# Patient Record
Sex: FEMALE | Race: WHITE | NOT HISPANIC OR LATINO | URBAN - METROPOLITAN AREA
[De-identification: names, ages, dates, MRNs, and addresses within clinical notes are randomized per-mention and may not be internally consistent; named-entity substitution may affect disease eponyms.]

---

## 2017-06-01 ENCOUNTER — OUTPATIENT (OUTPATIENT)
Dept: OUTPATIENT SERVICES | Facility: HOSPITAL | Age: 27
LOS: 1 days | End: 2017-06-01

## 2017-06-09 DIAGNOSIS — R69 ILLNESS, UNSPECIFIED: ICD-10-CM

## 2019-04-11 ENCOUNTER — HOSPITAL ENCOUNTER (INPATIENT)
Dept: HOSPITAL 74 - YASAS | Age: 29
LOS: 4 days | Discharge: HOME | DRG: 773 | End: 2019-04-15
Attending: SURGERY | Admitting: SURGERY
Payer: COMMERCIAL

## 2019-04-11 VITALS — BODY MASS INDEX: 27.6 KG/M2

## 2019-04-11 DIAGNOSIS — B18.2: ICD-10-CM

## 2019-04-11 DIAGNOSIS — F32.9: ICD-10-CM

## 2019-04-11 DIAGNOSIS — F12.20: ICD-10-CM

## 2019-04-11 DIAGNOSIS — Z91.5: ICD-10-CM

## 2019-04-11 DIAGNOSIS — F14.20: ICD-10-CM

## 2019-04-11 DIAGNOSIS — F11.20: ICD-10-CM

## 2019-04-11 DIAGNOSIS — F17.210: ICD-10-CM

## 2019-04-11 DIAGNOSIS — J45.20: ICD-10-CM

## 2019-04-11 DIAGNOSIS — F41.8: ICD-10-CM

## 2019-04-11 DIAGNOSIS — Z86.69: ICD-10-CM

## 2019-04-11 DIAGNOSIS — F10.230: Primary | ICD-10-CM

## 2019-04-11 LAB
ALBUMIN SERPL-MCNC: 3.5 G/DL (ref 3.4–5)
ALP SERPL-CCNC: 88 U/L (ref 45–117)
ALT SERPL-CCNC: 17 U/L (ref 13–61)
ANION GAP SERPL CALC-SCNC: 6 MMOL/L (ref 8–16)
AST SERPL-CCNC: 14 U/L (ref 15–37)
BILIRUB SERPL-MCNC: 0.4 MG/DL (ref 0.2–1)
BUN SERPL-MCNC: 13 MG/DL (ref 7–18)
CALCIUM SERPL-MCNC: 8.3 MG/DL (ref 8.5–10.1)
CHLORIDE SERPL-SCNC: 106 MMOL/L (ref 98–107)
CO2 SERPL-SCNC: 28 MMOL/L (ref 21–32)
CREAT SERPL-MCNC: 0.7 MG/DL (ref 0.55–1.3)
DEPRECATED RDW RBC AUTO: 16.5 % (ref 11.6–15.6)
GLUCOSE SERPL-MCNC: 80 MG/DL (ref 74–106)
HCT VFR BLD CALC: 39.6 % (ref 32.4–45.2)
HGB BLD-MCNC: 13 GM/DL (ref 10.7–15.3)
MCH RBC QN AUTO: 27.7 PG (ref 25.7–33.7)
MCHC RBC AUTO-ENTMCNC: 32.9 G/DL (ref 32–36)
MCV RBC: 84.1 FL (ref 80–96)
PLATELET # BLD AUTO: 190 K/MM3 (ref 134–434)
PMV BLD: 9.8 FL (ref 7.5–11.1)
POTASSIUM SERPLBLD-SCNC: 4.1 MMOL/L (ref 3.5–5.1)
PROT SERPL-MCNC: 6.9 G/DL (ref 6.4–8.2)
RBC # BLD AUTO: 4.71 M/MM3 (ref 3.6–5.2)
SODIUM SERPL-SCNC: 139 MMOL/L (ref 136–145)
WBC # BLD AUTO: 7.7 K/MM3 (ref 4–10)

## 2019-04-11 PROCEDURE — HZ2ZZZZ DETOXIFICATION SERVICES FOR SUBSTANCE ABUSE TREATMENT: ICD-10-PCS | Performed by: SURGERY

## 2019-04-11 RX ADMIN — Medication SCH MG: at 22:13

## 2019-04-11 NOTE — HP
CIWA Score


Nausea/Vomiting: 3 (vomit x 2)


Muscle Tremors: 1-None Visible, but Felt


Anxiety: 3


Agitation: 0-Normal Activity


Paroxysmal Sweats: 1-Minimal Palms Moist


Orientation: 1-Uncertain about Date


Tacttile Disturbances: 2-Mild Itch/Numbness/Burn (toes bilateral)


Auditory Disturbances: 2-Mild Harshness/Frighten


Visual Disturbances: 0-None


Headache: 0-None Present


CIWA-Ar Total Score: 13





- Admission Criteria


OASAS Guidelines: Admission for Medically Managed Detox: 


Requires at least one of the followin. CIWA greater than 12


2. Seizures within the past 24 hours


3. Delirium tremens within the past 24 hours


4. Hallucinations within the past 24 hours


5. Acute intervention needed for co  occurring medical disorder


6. Acute intervention needed for co  occurring psychiatric disorder


7. Severe withdrawal that cannot be handled at a lower level of care (continued


    vomiting, continued diarrhea, abnormal vital signs) requiring intravenous


    medication and/or fluids


8. Pregnancy





Patient presents the following: CIWA greater than 12


Admission Criteria Met: Admission criteria met





Admission ROS [facility]


Chief Complaint: 





" detox"


Allergies/Adverse Reactions: 


 Allergies











Allergy/AdvReac Type Severity Reaction Status Date / Time


 


tramadol Allergy   Verified 19 13:00











History of Present Illness: 





30 yo female, homeless, with hx of alcohol , keon, K2 and heroin (IV) dependence 

is here seeking detox. [facility] on methadone 120 mg qd, last dose 

today, dose pending verification. Utox positive fir THC, KEON, MET, FEN, MOP, 

Oxy. JOSE F = 0.00. PMHX: Asthma, Hep C. depression, anxiety and PTSD (non-

adherent. Reports hx of seizure cause by tramadol "couple of years ago." Denies 

hx of suicidal ideation x "couple of times" with last episode on year ago. 

Denies suicidal / homicidal ideation. Longest period of sobriety three months 

in 2017.  Last detox [facility] one year ago.


Exam Limitations: No Limitations





- Ebola screening


Have you traveled outside of the country in the last 21 days: No (N)


Have you had contact with anyone from an Ebola affected area: No


Do you have a fever: No





- Review of Systems


Constitutional: Loss of Appetite, Changes in sleep, Unintentional Wgt. Loss (

20lbs in past two months), Other (fatigue)


EENT: reports: Nose Congestion, Dental Problems (+ multiple caries)


Respiratory: reports: No Symptoms reported


Cardiac: reports: No Symptoms Reported


GI: reports: Nausea, Poor Appetite, Poor Fluid Intake, Vomiting


: reports: No Symptoms Reported


Musculoskeletal: reports: Back Pain


Integumentary: reports: No Symptoms Reported


Neuro: reports: Numbness (b/l toes)


Endocrine: reports: Increased Thirst


Hematology: reports: No Symptoms Reported


Psychiatric: reports: Orientated x3, Anxious


Other Systems: Reviewed and Negative





Patient History





- Patient Medical History


Hx Anemia: No


Hx Asthma: Yes


Hx Chronic Obstructive Pulmonary Disease (COPD): No


Hx Cancer: No


Hx Cardiac Disorders: Yes (Bradycardia)


Hx Congestive Heart Failure: No


Hx Hypertension: No


Hx Hypercholesterolemia: No


Hx Pacemaker: No


HX Cerebrovascular Accident: No


Hx Seizures: Yes (seizure cause by tramadol "couple of years ago." )


Hx Dementia: No


Hx Diabetes: No


Hx Gastrointestinal Disorders: No


Hx Liver Disease: Yes


Hx Genitourinary Disorders: No


Hx Sexually Transmitted Disorders: No


Hx Renal Disease (ESRD): No


Hx Thyroid Disease: No


Hx Human Immunodeficiency Virus (HIV): No


Hx Hepatitis C: Yes


Hx Depression: Yes


Hx Suicide Attempt: Yes (couple of times" with last episode on year ago)


Hx Bipolar Disorder: No


Hx Schizophrenia: No





- Patient Surgical History


Past Surgical History: Yes


Hx  Section: Yes (x4)


Other Surgical History: ganglion cyst removal, gallblader remove 





- PPD History


Previous Implant?: No


Documented Results: Negative w/o proof


PPD to be Administered?: Yes





- Reproductive History


Patient is a Female of Child Bearing Age (11 -55 yrs old): Yes


Last Menstrual Period: 19 (appx )


Patient Pregnant: No





- Smoking Cessation


Smoking history: Current every day smoker


Have you smoked in the past 12 months: Yes


Aproximately how many cigarettes per day: 13


Hx Chewing Tobacco Use: No


Initiated information on smoking cessation: Yes


'Breaking Loose' booklet given: 19





- Substance & Tx. History


Hx Alcohol Use: Yes


Hx Substance Use: Yes


Substance Use Type: Alcohol, Cocaine, Heroin, Marijuana


Hx Substance Use Treatment: Yes ( Last detox [facility] one year ago.)





- Substances abused


  ** Heroin


Substance route: Injection


Frequency: Daily


Amount used: 4 BAGS


Age of first use: 20


Date of last use: 19





  ** Cocaine


Substance route: Injection


Frequency: Daily


Amount used: 1 BAG


Age of first use: 20


Date of last use: 19





  ** K2/Spice


Substance route: Smoking


Frequency: Daily


Amount used: 15 STICKS


Age of first use: 28


Date of last use: 19





  ** Alcohol


Substance route: Oral


Frequency: Daily


Amount used: 2 nips + 2 x 24 oz gloria 


Age of first use: 12


Date of last use: 19





Family Disease History





- Family Disease History


Family Disease History: Other: Father (alcohol )





Admission Physical Exam [facility]





- Vital Signs


Vital Signs: 


 Vital Signs - 24 hr











  19





  13:03


 


Temperature 96.8 F L


 


Pulse Rate 56 L


 


Respiratory 18





Rate 


 


Blood Pressure 94/56 L














- Physical


General Appearance: Yes: Disheveled, Mild Distress, Thin, Sweating, Anxious


HEENTM: Yes: EOMI, Hearing grossly Normal, Normal ENT Inspection, Normocephalic

, Normal Voice, EZEQUIEL, Pharynx Normal, Tm's normal, Rhinorrhea, Other (poor 

dentition)


Respiratory: Yes: Chest Non-Tender, Lungs Clear, No Respiratory Distress, No 

Accessory Muscle Use, Wheezing


Neck: Yes: Within Normal Limits


Breast: Yes: Breast Exam Deferred


Cardiology: Yes: Regular Rhythm, Bradycardia


Abdominal: Yes: Normal Bowel Sounds, Non Tender, Soft, Protuberent


Genitourinary: Yes: Within Normal Limits


Back: Yes: Normal Inspection


Musculoskeletal: Yes: Back pain, Other (right hip pain)


Extremities: Yes: Normal Capillary Refill, Normal Inspection, Normal Range of 

Motion


Neurological: Yes: CNs II-XII NML intact, Fully Oriented, Alert, Motor Strength 

5/5, Depressed Affect


Integumentary: Yes: Normal Color, Dry, Warm, Track Marks (bilateral anticubital 

fossa no infection present)


Lymphatic: Yes: Within Normal Limits





- Diagnostic


(1) Opioid dependence on agonist therapy


Current Visit: Yes   Status: Chronic   Comment: on methadone maintance 120 mg qd

, last dose today, dose pending verification    





(2) Cocaine dependence


Current Visit: Yes   Status: Acute   





(3) Cannabis dependence


Current Visit: Yes   Status: Acute   





(4) Alcohol dependence with uncomplicated withdrawal


Current Visit: Yes   Status: Acute   





(5) Back pain


Current Visit: Yes   Status: Acute   


Qualifiers: 


   Back pain location: low back pain   Back pain laterality: right   Sciatica 

presence: without sciatica 





(6) Asthma


Current Visit: Yes   Status: Chronic   


Qualifiers: 


   Asthma severity: mild   Asthma persistence: intermittent   Asthma 

complication type: uncomplicated   Qualified Code(s): J45.20 - Mild 

intermittent asthma, uncomplicated   





(7) Wheezing


Current Visit: Yes   Status: Acute   





Cleared for Admission S





- Detox or Rehab


[facility] Level of Care: Medically Managed


Detox Regimen/Protocol: Librium





Breathalyzer





- Breathalyzer


Breathalyzer: 0





POC Urine pregnancy test





- Test device


Pregnancy test lot number: [id number]


Expiration date: 20





- Control


Pregnancy test control: Yes





- Result


Urine Pregnancy Test Results: Negative - NO line present





Urine Drug Screen





- Test Device


Lot number: [id number]


Expiration date: 20





- Control


Is test valid?: Yes





- Results


Drug screen NEGATIVE: No


Urine drug screen results: THC-Marijuana, KEON-Cocaine, MET-Methamphetamine, FEN-

Fentanyl, MOP-Opiates, OXY-Oxycodone





Inpatient Rehab Admission





- Rehab Decision to Admit


Inpatient rehab admission?: No

## 2019-04-12 RX ADMIN — Medication SCH: at 22:44

## 2019-04-12 RX ADMIN — ONDANSETRON PRN MG: 4 TABLET, ORALLY DISINTEGRATING ORAL at 15:55

## 2019-04-12 RX ADMIN — Medication SCH TAB: at 09:58

## 2019-04-12 RX ADMIN — TRIMETHOBENZAMIDE HYDROCHLORIDE PRN MG: 100 INJECTION INTRAMUSCULAR at 10:50

## 2019-04-12 RX ADMIN — NICOTINE SCH MG: 14 PATCH, EXTENDED RELEASE TRANSDERMAL at 10:02

## 2019-04-12 RX ADMIN — IBUPROFEN PRN MG: 400 TABLET, FILM COATED ORAL at 01:46

## 2019-04-12 RX ADMIN — TRIMETHOBENZAMIDE HYDROCHLORIDE PRN MG: 100 INJECTION INTRAMUSCULAR at 18:15

## 2019-04-12 NOTE — PN
[facility] Progress Note


Note: 





ekg performed; sinus pillo prolonged qt. pulse 54.


pt denies of any chest pain/discomfort. Pt appears a bit better since receiving 

tigan IM. zofran SL prn also ordered


pt states she is feeling a bit better since taking a shower.


will continue to monitor

## 2019-04-12 NOTE — PN
S CIWA





- CIWA Score


Nausea/Vomitin


Muscle Tremors: 3


Anxiety: 3


Agitation: 4-Moderately Restless


Paroxysmal Sweats: 3


Orientation: 0-Oriented


Tacttile Disturbances: 0-None


Auditory Disturbances: 0-None


Visual Disturbances: 0-None


Headache: 0-None Present


CIWA-Ar Total Score: 15





[facility] Progress Note (SOAP)


Subjective: 





sweats


shakes


interrupted sleep


nausea


vomiting


Objective: 





19 13:53


 Vital Signs











Temperature  97.5 F L  19 09:21


 


Pulse Rate  53 L  19 09:21


 


Respiratory Rate  16   19 09:21


 


Blood Pressure  115/60   19 09:21


 


O2 Sat by Pulse Oximetry (%)      








 Laboratory Tests











  19





  14:30 14:30 14:30


 


WBC   7.7 


 


RBC   4.71 


 


Hgb   13.0 


 


Hct   39.6 


 


MCV   84.1 


 


MCH   27.7 


 


MCHC   32.9 


 


RDW   16.5 H 


 


Plt Count   190 


 


MPV   9.8 


 


Sodium  139  


 


Potassium  4.1  


 


Chloride  106  


 


Carbon Dioxide  28  


 


Anion Gap  6 L  


 


BUN  13  


 


Creatinine  0.7  


 


Creat Clearance w eGFR  98.25  


 


Random Glucose  80  


 


Calcium  8.3 L  


 


Total Bilirubin  0.4  


 


AST  14 L  


 


ALT  17  


 


Alkaline Phosphatase  88  


 


Total Protein  6.9  


 


Albumin  3.5  


 


RPR Titer    Nonreactive








aaox3


lying in bed


no acute distress


Assessment: 





19 13:53


withdrawls sx


Plan: 





continue detox


increase fluids


tigan IM prn

## 2019-04-12 NOTE — PN
[facility] Progress Note


Note: 





Psychiatric nurse practitioner note:


Writer attempted to see patient twice for psychiatric consultation. Patient is 

lethargic and vomiting. She is actively withdrawing. She reports not feeling 

well today and stated to writer, " I do not want to see psychiatry today. I do 

not feel well. Its ok. thank you. Nursing staff informed.

## 2019-04-13 RX ADMIN — METHOCARBAMOL PRN MG: 500 TABLET ORAL at 16:47

## 2019-04-13 RX ADMIN — IBUPROFEN PRN MG: 400 TABLET, FILM COATED ORAL at 08:10

## 2019-04-13 RX ADMIN — Medication SCH TAB: at 09:05

## 2019-04-13 RX ADMIN — ONDANSETRON PRN MG: 4 TABLET, ORALLY DISINTEGRATING ORAL at 01:25

## 2019-04-13 RX ADMIN — NICOTINE SCH MG: 14 PATCH, EXTENDED RELEASE TRANSDERMAL at 09:25

## 2019-04-13 RX ADMIN — IBUPROFEN PRN MG: 400 TABLET, FILM COATED ORAL at 22:25

## 2019-04-13 RX ADMIN — ONDANSETRON PRN MG: 4 TABLET, ORALLY DISINTEGRATING ORAL at 13:53

## 2019-04-13 RX ADMIN — Medication PRN MG: at 22:25

## 2019-04-13 RX ADMIN — ONDANSETRON PRN MG: 4 TABLET, ORALLY DISINTEGRATING ORAL at 22:25

## 2019-04-13 RX ADMIN — METHADONE HYDROCHLORIDE SCH MG: 40 TABLET ORAL at 05:40

## 2019-04-13 RX ADMIN — Medication SCH MG: at 22:24

## 2019-04-13 NOTE — PN
S CIWA





- CIWA Score


Nausea/Vomitin


Muscle Tremors: 2


Anxiety: 3


Agitation: 2


Paroxysmal Sweats: 2


Orientation: 0-Oriented


Tacttile Disturbances: 2-Mild Itch/Numbness/Burn


Auditory Disturbances: 0-None


Visual Disturbances: 0-None


Headache: 2-Mild


CIWA-Ar Total Score: 15





[facility] Progress Note (SOAP)


Subjective: 





Tremors, sweats, abdominal cramps, back pain


Objective: 





19 12:44


 Vital Signs











  19





  06:32 10:00


 


Temperature 98.2 F 97.9 F


 


Pulse Rate 58 L 59 L


 


Respiratory 18 16





Rate  


 


Blood Pressure 119/67 101/58 L








 Laboratory Last Values











WBC  7.7 K/mm3 (4.0-10.0)   19  14:30    


 


RBC  4.71 M/mm3 (3.60-5.2)   19  14:30    


 


Hgb  13.0 GM/dL (10.7-15.3)   19  14:30    


 


Hct  39.6 % (32.4-45.2)   19  14:30    


 


MCV  84.1 fl (80-96)   19  14:30    


 


MCH  27.7 pg (25.7-33.7)   19  14:30    


 


MCHC  32.9 g/dl (32.0-36.0)   19  14:30    


 


RDW  16.5 % (11.6-15.6)  H  19  14:30    


 


Plt Count  190 K/MM3 (134-434)   19  14:30    


 


MPV  9.8 fl (7.5-11.1)   19  14:30    


 


Sodium  139 mmol/L (136-145)   19  14:30    


 


Potassium  4.1 mmol/L (3.5-5.1)   19  14:30    


 


Chloride  106 mmol/L ()   19  14:30    


 


Carbon Dioxide  28 mmol/L (21-32)   19  14:30    


 


Anion Gap  6 MMOL/L (8-16)  L  19  14:30    


 


BUN  13 mg/dL (7-18)   19  14:30    


 


Creatinine  0.7 mg/dL (0.55-1.3)   19  14:30    


 


Creat Clearance w eGFR  98.25  (>60)   19  14:30    


 


Random Glucose  80 mg/dL ()   19  14:30    


 


Calcium  8.3 mg/dL (8.5-10.1)  L  19  14:30    


 


Total Bilirubin  0.4 mg/dL (0.2-1)   19  14:30    


 


AST  14 U/L (15-37)  L  19  14:30    


 


ALT  17 U/L (13-61)   19  14:30    


 


Alkaline Phosphatase  88 U/L ()   19  14:30    


 


Total Protein  6.9 g/dl (6.4-8.2)   19  14:30    


 


Albumin  3.5 g/dl (3.4-5.0)   19  14:30    


 


RPR Titer  Nonreactive  (NONREACTIVE)   19  14:30    








Labs noted


Assessment: 





19 12:44


Withdrawal sx


Plan: 





Continue detox

## 2019-04-14 RX ADMIN — HYDROXYZINE PAMOATE PRN MG: 25 CAPSULE ORAL at 16:50

## 2019-04-14 RX ADMIN — ONDANSETRON PRN MG: 4 TABLET, ORALLY DISINTEGRATING ORAL at 06:30

## 2019-04-14 RX ADMIN — METHOCARBAMOL PRN MG: 500 TABLET ORAL at 10:40

## 2019-04-14 RX ADMIN — Medication SCH MG: at 22:21

## 2019-04-14 RX ADMIN — METHOCARBAMOL PRN MG: 500 TABLET ORAL at 16:50

## 2019-04-14 RX ADMIN — IBUPROFEN PRN MG: 400 TABLET, FILM COATED ORAL at 09:39

## 2019-04-14 RX ADMIN — LIDOCAINE SCH PATCH: 50 PATCH TOPICAL at 14:58

## 2019-04-14 RX ADMIN — HYDROXYZINE PAMOATE PRN MG: 25 CAPSULE ORAL at 22:21

## 2019-04-14 RX ADMIN — Medication PRN MG: at 22:22

## 2019-04-14 RX ADMIN — IBUPROFEN PRN MG: 400 TABLET, FILM COATED ORAL at 22:21

## 2019-04-14 RX ADMIN — NICOTINE SCH MG: 14 PATCH, EXTENDED RELEASE TRANSDERMAL at 09:42

## 2019-04-14 RX ADMIN — METHADONE HYDROCHLORIDE SCH MG: 40 TABLET ORAL at 05:21

## 2019-04-14 RX ADMIN — Medication SCH TAB: at 09:44

## 2019-04-14 NOTE — PN
[facility] Progress Note


Note: 


PATIENT CONTINUES WITH DETOX REGIMEN FOR ETOH WITHDRAWAL. PATIENT C/O MILD 

ANXIETY AND BACK PAIN. 


 Vital Signs











Temperature  97.1 F L  04/14/19 09:29


 


Pulse Rate  79   04/14/19 09:29


 


Respiratory Rate  16   04/14/19 09:29


 


Blood Pressure  94/59 L  04/14/19 09:29


 


O2 Sat by Pulse Oximetry (%)      








 Laboratory Tests











  04/11/19 04/11/19 04/11/19





  14:30 14:30 14:30


 


WBC   7.7 


 


RBC   4.71 


 


Hgb   13.0 


 


Hct   39.6 


 


MCV   84.1 


 


MCH   27.7 


 


MCHC   32.9 


 


RDW   16.5 H 


 


Plt Count   190 


 


MPV   9.8 


 


Sodium  139  


 


Potassium  4.1  


 


Chloride  106  


 


Carbon Dioxide  28  


 


Anion Gap  6 L  


 


BUN  13  


 


Creatinine  0.7  


 


Creat Clearance w eGFR  98.25  


 


Random Glucose  80  


 


Calcium  8.3 L  


 


Total Bilirubin  0.4  


 


AST  14 L  


 


ALT  17  


 


Alkaline Phosphatase  88  


 


Total Protein  6.9  


 


Albumin  3.5  


 


RPR Titer    Nonreactive








PE:





ALERT AND ORIENTED X 3


SKIN WARM AND DRY


CAR S1S2


RESP CTA BL


EXT FULL ROM, AMB AD TAYLA, +LS SPINE TENDERNESS


+ANXIETY





A/P:





WITHDRAWAL SX


LBP











CONTINUE DETOX


ENCOURAGE ORAL FLUIDS


ADD VISTARIL 25MG EVERY 6 HR PRN FOR ANXIETY


LIDOCAINE PATCH FOR LBP


FOR D/C IN AM

## 2019-04-14 NOTE — PN
[facility] Progress Note


Note: 





withdrawal symptom


 Vital Signs











Temperature  98.1 F   04/14/19 15:07


 


Pulse Rate  76   04/14/19 14:55


 


Respiratory Rate  17   04/14/19 14:55


 


Blood Pressure  117/69   04/14/19 14:55


 


O2 Sat by Pulse Oximetry (%)      








librium 10 mgs po now


ensure plus 12 mls po bid


close monitoring

## 2019-04-15 VITALS — TEMPERATURE: 97.9 F | SYSTOLIC BLOOD PRESSURE: 101 MMHG | HEART RATE: 96 BPM | DIASTOLIC BLOOD PRESSURE: 80 MMHG

## 2019-04-15 RX ADMIN — LIDOCAINE SCH: 50 PATCH TOPICAL at 10:31

## 2019-04-15 RX ADMIN — METHADONE HYDROCHLORIDE SCH MG: 40 TABLET ORAL at 05:05

## 2019-04-15 RX ADMIN — IBUPROFEN PRN MG: 400 TABLET, FILM COATED ORAL at 05:33

## 2019-04-15 RX ADMIN — Medication SCH: at 10:31

## 2019-04-15 RX ADMIN — NICOTINE SCH: 14 PATCH, EXTENDED RELEASE TRANSDERMAL at 10:31

## 2019-04-15 NOTE — DS
[facility] Detox Discharge Summary


Admission Date: 


04/11/19





Discharge Date: 04/15/19





- History


Present History: Alcohol Dependence, Cannabis Dependence, Cocaine Dependence





- Physical Exam Results


Vital Signs: 


 Vital Signs











Temperature  97.9 F   04/15/19 03:00


 


Pulse Rate  96 H  04/15/19 03:00


 


Respiratory Rate  18   04/15/19 03:00


 


Blood Pressure  101/80   04/15/19 03:00


 


O2 Sat by Pulse Oximetry (%)      














- Treatment


Hospital Course: Detox Protocol Followed, Detoxed Safely, Responded well, 

Discharged Condition Good, Rehab Referral Accepted





- Medication


Discharge Medications: 


Ambulatory Orders





Gabapentin [Neurontin -] 200 mg PO DAILY 04/11/19 











- Diagnosis


(1) Alcohol dependence with uncomplicated withdrawal


Current Visit: Yes   Status: Chronic   





(2) Back pain


Current Visit: Yes   Status: Chronic   


Qualifiers: 


   Back pain location: low back pain   Back pain laterality: right   Sciatica 

presence: without sciatica 





(3) Cannabis dependence


Current Visit: Yes   Status: Chronic   





(4) Cocaine dependence


Current Visit: Yes   Status: Chronic   


Qualifiers: 


   Substance use status: uncomplicated   Qualified Code(s): F14.20 - Cocaine 

dependence, uncomplicated   





(5) Wheezing


Current Visit: Yes   Status: Acute   





(6) Asthma


Current Visit: Yes   Status: Chronic   


Qualifiers: 


   Asthma severity: mild   Asthma persistence: intermittent   Asthma 

complication type: uncomplicated   Qualified Code(s): J45.20 - Mild 

intermittent asthma, uncomplicated   





(7) Opioid dependence on agonist therapy


Current Visit: Yes   Status: Chronic   





- AMA


Did Patient Leave Against Medical Advice: No (referred to [facility] 

methadone clinic)

## 2019-04-15 NOTE — EKG
Test Reason : 

Blood Pressure : ***/*** mmHG

Vent. Rate : 054 BPM     Atrial Rate : 054 BPM

   P-R Int : 116 ms          QRS Dur : 080 ms

    QT Int : 520 ms       P-R-T Axes : -08 -02 021 degrees

   QTc Int : 493 ms

 

SINUS BRADYCARDIA

PROLONGED QT

ABNORMAL ECG

NO PREVIOUS ECGS AVAILABLE

Confirmed by AMELIE ESCOTO, MALKA ([id number]) on 4/15/2019 10:40:29 AM

 

Referred By: CLARENCE BERKOWITZ           Confirmed By:MALKA KINSEY MD

## 2020-06-23 ENCOUNTER — HOSPITAL ENCOUNTER (INPATIENT)
Dept: HOSPITAL 74 - YASAS | Age: 30
LOS: 5 days | Discharge: HOME | DRG: 773 | End: 2020-06-28
Attending: ALLERGY & IMMUNOLOGY | Admitting: ALLERGY & IMMUNOLOGY
Payer: COMMERCIAL

## 2020-06-23 VITALS — BODY MASS INDEX: 28.3 KG/M2

## 2020-06-23 DIAGNOSIS — Z91.5: ICD-10-CM

## 2020-06-23 DIAGNOSIS — R74.0: ICD-10-CM

## 2020-06-23 DIAGNOSIS — R00.1: ICD-10-CM

## 2020-06-23 DIAGNOSIS — Z59.0: ICD-10-CM

## 2020-06-23 DIAGNOSIS — Z62.810: ICD-10-CM

## 2020-06-23 DIAGNOSIS — R82.90: ICD-10-CM

## 2020-06-23 DIAGNOSIS — F14.20: ICD-10-CM

## 2020-06-23 DIAGNOSIS — F17.210: ICD-10-CM

## 2020-06-23 DIAGNOSIS — K59.00: ICD-10-CM

## 2020-06-23 DIAGNOSIS — F43.10: ICD-10-CM

## 2020-06-23 DIAGNOSIS — R79.89: ICD-10-CM

## 2020-06-23 DIAGNOSIS — F11.20: ICD-10-CM

## 2020-06-23 DIAGNOSIS — F19.282: ICD-10-CM

## 2020-06-23 DIAGNOSIS — Z88.8: ICD-10-CM

## 2020-06-23 DIAGNOSIS — R11.0: ICD-10-CM

## 2020-06-23 DIAGNOSIS — Z86.69: ICD-10-CM

## 2020-06-23 DIAGNOSIS — F10.230: Primary | ICD-10-CM

## 2020-06-23 PROCEDURE — HZ2ZZZZ DETOXIFICATION SERVICES FOR SUBSTANCE ABUSE TREATMENT: ICD-10-PCS | Performed by: ALLERGY & IMMUNOLOGY

## 2020-06-23 PROCEDURE — U0003 INFECTIOUS AGENT DETECTION BY NUCLEIC ACID (DNA OR RNA); SEVERE ACUTE RESPIRATORY SYNDROME CORONAVIRUS 2 (SARS-COV-2) (CORONAVIRUS DISEASE [COVID-19]), AMPLIFIED PROBE TECHNIQUE, MAKING USE OF HIGH THROUGHPUT TECHNOLOGIES AS DESCRIBED BY CMS-2020-01-R: HCPCS

## 2020-06-23 RX ADMIN — HYDROXYZINE PAMOATE SCH MG: 25 CAPSULE ORAL at 22:44

## 2020-06-23 RX ADMIN — Medication SCH TAB: at 17:40

## 2020-06-23 RX ADMIN — ONDANSETRON PRN MG: 4 TABLET, ORALLY DISINTEGRATING ORAL at 17:46

## 2020-06-23 RX ADMIN — Medication SCH MG: at 22:43

## 2020-06-23 RX ADMIN — HYDROXYZINE PAMOATE SCH MG: 25 CAPSULE ORAL at 17:40

## 2020-06-23 RX ADMIN — NICOTINE SCH MG: 7 PATCH TRANSDERMAL at 17:42

## 2020-06-23 RX ADMIN — IBUPROFEN PRN MG: 400 TABLET, FILM COATED ORAL at 22:47

## 2020-06-23 SDOH — ECONOMIC STABILITY - HOUSING INSECURITY: HOMELESSNESS: Z59.0

## 2020-06-23 NOTE — BHS.RME
Substance Use & Tx History





- Substance Use History


  ** Alcohol


Substance amount: 4-5 beers 12 0z


Frequency of use: Daily


Substance route: Oral


Date of Last Use: 20 (9am)





  ** Heroin


Substance amount: 1 bundle


Frequency of use: Daily


Substance route: Injection (ex: intravenous or skin popping)


Date of Last Use: 20





  ** Nicotine


Substance amount: 1 pack


Frequency of use: Daily


Substance route: Smoking


Date of Last Use: 20





Physical/Psych/Mental Status





- Behavior


General Behavior: Increased activity (restlessness, agitation)


Eye Contact: Normal





- Cooperativeness


Cooperativeness: Cooperative





- Thinking


Thought Processes: Tight, Logical, Goal Directed





- Physical Health Problems


Is patient presently having any pain?: No


Does patient presently have any injuries (include location): No


Does patient currently have a fever: No


Is patient pregnant: No





CIWA


Nausea/Vomitin-Mild Nausea/No Vomiting


Muscle Tremors: 1-None Visible, but Felt


Anxiety: 4-Mod. Anxious/Guarded


Agitation: 4-Moderately Restless


Paroxysmal Sweats: 5


Orientation: 0-Oriented


Tacttile Disturbances: 0-None


Auditory Disturbances: 0-None


Visual Disturbances: 0-None


Headache: 0-None Present


CIWA-Ar Total Score: 15

## 2020-06-23 NOTE — HP
CIWA Score


Nausea/Vomitin-Mild Nausea/No Vomiting


Muscle Tremors: 1-None Visible, but Felt


Anxiety: 4-Mod. Anxious/Guarded


Agitation: 4-Moderately Restless


Paroxysmal Sweats: 5


Orientation: 0-Oriented


Tacttile Disturbances: 0-None


Auditory Disturbances: 0-None


Visual Disturbances: 0-None


Headache: 0-None Present


CIWA-Ar Total Score: 15





- Admission Criteria


OASAS Guidelines: Admission for Medically Managed Detox: 


Requires at least one of the followin. CIWA greater than 12


2. Seizures within the past 24 hours


3. Delirium tremens within the past 24 hours


4. Hallucinations within the past 24 hours


5. Acute intervention needed for co  occurring medical disorder


6. Acute intervention needed for co  occurring psychiatric disorder


7. Severe withdrawal that cannot be handled at a lower level of care (continued


    vomiting, continued diarrhea, abnormal vital signs) requiring intravenous


    medication and/or fluids


8. Pregnancy








Admitting History and Physical





- Admission


Chief Complaint: Ms. Bonner is a 31 yo woman who presents to [facility] 

requesting admission to detox for alcohol and heroin use disorder.


History of Present Illness: 


Ms. Bonner is a 31 yo woman who presents to [facility] requesting admission to 

detox for alcohol and heroin use disorder. 





She was here in 2019 for detox.  She did not go to rehab afterwards and 

relapsed one day post discharge.





PMH: HCV (+) but told last test was negative, never tx, asthma on an inhaler


PSH: cholecystectomy, ganglion cyst b/l wrist., C section x 5


Psych: anxiety, depression PtSD, no meds


SoC: homeless, on the street, no shelter


Legal: none





Substance Use History


  ** Alcohol


Substance amount: 4-5 beers 12 0z


Frequency of use: Daily


Substance route: Oral


Date of Last Use: 20 (9am)


first : age 20 y





Seizure 2019


Blackout one week ago


Admits to eye opener





  ** Heroin


Substance amount: 1 bundle


Frequency of use: Daily


Substance route: Injection (ex: intravenous or skin popping)


Date of Last Use: 20


Fist use age 16 y


No OD


Has Narcan





  ** Nicotine


Substance amount: 1 pack


Frequency of use: Daily


Substance route: Smoking


Date of Last Use: 20


First use age 12 y





K2 3 blunts daily, began age 29 y, last smoke today





Methadone program, 100 mg, last medicated .  [facility]


History Source: Patient


Limitations to Obtaining History: No Limitations





- Past Medical History


...LMP: 19





- Smoking History


Smoking history: Current every day smoker


Have you smoked in the past 12 months: Yes


Aproximately how many cigarettes per day: 13





- Alcohol/Substance Use


Hx Alcohol Use: Yes





Admission ROS [facility]





- HPI


Allergies/Adverse Reactions: 


                                    Allergies











Allergy/AdvReac Type Severity Reaction Status Date / Time


 


tramadol Allergy   Verified 19 13:00











Exam Limitations: No Limitations





- Ebola screening


Have you been sick,other than usual withdrawal symptoms: No


Do you have a fever: No





- Review of Systems


Constitutional: Unintentional Wgt. Loss


EENT: reports: Hearing Loss (right ear)


Respiratory: reports: No Symptoms reported


Cardiac: reports: No Symptoms Reported


GI: reports: Nausea


: reports: No Symptoms Reported


Musculoskeletal: reports: Back Pain


Integumentary: reports: No Symptoms Reported


Neuro: reports: No Symptoms reported


Endocrine: reports: No Symptoms Reported


Hematology: reports: No Symptoms Reported


Psychiatric: reports: Anxious





Patient History





- Patient Medical History


Hx Anemia: No


Hx Asthma: Yes


Hx Chronic Obstructive Pulmonary Disease (COPD): No


Hx Cancer: No


Hx Cardiac Disorders: Yes (Bradycardia)


Hx Congestive Heart Failure: No


Hx Hypertension: No


Hx Hypercholesterolemia: No


Hx Pacemaker: No


HX Cerebrovascular Accident: No


Hx Seizures: Yes (seizure cause by tramadol "couple of years ago." )


Hx Dementia: No


Hx Diabetes: No


Hx Gastrointestinal Disorders: No


Hx Liver Disease: Yes


Hx Genitourinary Disorders: No


Hx Sexually Transmitted Disorders: No


Hx Renal Disease (ESRD): No


Hx Thyroid Disease: No


Hx Human Immunodeficiency Virus (HIV): No


Hx Hepatitis C: Yes


Hx Depression: Yes


Hx Suicide Attempt: Yes (couple of times" with last episode on year ago)


Hx Bipolar Disorder: No


Hx Schizophrenia: No





- Patient Surgical History


Past Surgical History: Yes


Hx Neurologic Surgery: No


Hx Cataract Extraction: No


Hx Cardiac Surgery: No


Hx Lung Surgery: No


Hx Breast Surgery: No


Hx Breast Biopsy: No


Hx Abdominal Surgery: No


Hx Appendectomy: No


Hx Cholecystectomy: No


Hx Genitourinary Surgery: No


Hx  Section: Yes (x4)


Hx Orthopedic Surgery: No


Other Surgical History: ganglion cyst removal, gallblader remove 





- PPD History


Date: 19





- Reproductive History


Last Menstrual Period: 19





- Smoking Cessation


Smoking history: Current every day smoker


Have you smoked in the past 12 months: Yes


Aproximately how many cigarettes per day: 20


Cigars Per Day: 0


Hx Chewing Tobacco Use: No


Initiated information on smoking cessation: Yes


'Breaking Loose' booklet given: 20





Admission Physical Exam [facility] Physical


General Appearance: Yes: Nourished, Tremorous, Anxious


HEENTM: Yes: Hearing grossly Normal, Normocephalic, Normal Voice


Respiratory: Yes: Lungs Clear, Normal Breath Sounds, No Accessory Muscle Use


Neck: Yes: Within Normal Limits, Supple, Trachea in good position


Breast: Yes: Breast Exam Deferred


Cardiology: Yes: Regular Rhythm, Regular Rate, S1, S2


Abdominal: Yes: Soft (mild mid line), Increased Bowel Sounds, Tenderness


Genitourinary: Yes: Other (deferred)


Back: Yes: Normal Inspection


Musculoskeletal: Yes: Gait Steady


Extremities: Yes: Non-Tender, Tremors, Other (chronic ankle, knee and hip pain 

after jump out of second story window)


Neurological: Yes: Alert, Normal Response


Integumentary: Yes: Dry, Warm, Track Marks, Other (self inflicted lateral lines 

over medial forearms)


Lymphatic: Yes: Within Normal Limits





Cleared for Admission [facility]





- Detox or Rehab


[facility] Level of Care: Medically Managed


Detox Regimen/Protocol: Librium





Breathalyzer





- Breathalyzer


Breathalyzer: 0





POC Urine pregnancy test





- Test device


Pregnancy test lot number: [id number]


Expiration date: 20





- Control


Pregnancy test control: Yes





Urine Drug Screen





- Test Device


Lot number: [id number]


Expiration date: 21





- Control


Is test valid?: Yes





- Results


Drug screen NEGATIVE: No


Urine drug screen results: GENESIS-Cocaine, FEN-Fentanyl, MOP-Opiates, MTD-Methadone





Inpatient Rehab Admission





- Rehab Decision to Admit


Inpatient rehab admission?: No

## 2020-06-24 LAB
ALBUMIN SERPL-MCNC: 3.1 G/DL (ref 3.4–5)
ALP SERPL-CCNC: 97 U/L (ref 45–117)
ALT SERPL-CCNC: 83 U/L (ref 13–61)
ANION GAP SERPL CALC-SCNC: 4 MMOL/L (ref 8–16)
AST SERPL-CCNC: 49 U/L (ref 15–37)
BILIRUB SERPL-MCNC: 0.8 MG/DL (ref 0.2–1)
BUN SERPL-MCNC: 21.7 MG/DL (ref 7–18)
CALCIUM SERPL-MCNC: 8.6 MG/DL (ref 8.5–10.1)
CHLORIDE SERPL-SCNC: 106 MMOL/L (ref 98–107)
CO2 SERPL-SCNC: 30 MMOL/L (ref 21–32)
CREAT SERPL-MCNC: 0.9 MG/DL (ref 0.55–1.3)
DEPRECATED RDW RBC AUTO: 17.5 % (ref 11.6–15.6)
GLUCOSE SERPL-MCNC: 87 MG/DL (ref 74–106)
HCT VFR BLD CALC: 40.7 % (ref 32.4–45.2)
HGB BLD-MCNC: 13.2 GM/DL (ref 10.7–15.3)
MCH RBC QN AUTO: 26.6 PG (ref 25.7–33.7)
MCHC RBC AUTO-ENTMCNC: 32.4 G/DL (ref 32–36)
MCV RBC: 82 FL (ref 80–96)
PLATELET # BLD AUTO: 185 K/MM3 (ref 134–434)
PMV BLD: 9.1 FL (ref 7.5–11.1)
POTASSIUM SERPLBLD-SCNC: 3.9 MMOL/L (ref 3.5–5.1)
PROT SERPL-MCNC: 6.7 G/DL (ref 6.4–8.2)
RBC # BLD AUTO: 4.96 M/MM3 (ref 3.6–5.2)
SODIUM SERPL-SCNC: 140 MMOL/L (ref 136–145)
WBC # BLD AUTO: 3.6 K/MM3 (ref 4–10)

## 2020-06-24 RX ADMIN — Medication SCH: at 11:29

## 2020-06-24 RX ADMIN — HYDROXYZINE PAMOATE SCH MG: 25 CAPSULE ORAL at 11:35

## 2020-06-24 RX ADMIN — IBUPROFEN PRN MG: 400 TABLET, FILM COATED ORAL at 13:53

## 2020-06-24 RX ADMIN — Medication SCH MG: at 22:22

## 2020-06-24 RX ADMIN — NICOTINE SCH: 7 PATCH TRANSDERMAL at 11:32

## 2020-06-24 RX ADMIN — ONDANSETRON PRN MG: 4 TABLET, ORALLY DISINTEGRATING ORAL at 06:11

## 2020-06-24 RX ADMIN — METHOCARBAMOL PRN MG: 500 TABLET ORAL at 13:54

## 2020-06-24 RX ADMIN — HYDROXYZINE PAMOATE SCH MG: 25 CAPSULE ORAL at 06:12

## 2020-06-24 RX ADMIN — NICOTINE SCH MG: 21 PATCH TRANSDERMAL at 14:18

## 2020-06-24 RX ADMIN — METHOCARBAMOL PRN MG: 500 TABLET ORAL at 06:11

## 2020-06-24 RX ADMIN — METHOCARBAMOL PRN MG: 500 TABLET ORAL at 22:22

## 2020-06-24 RX ADMIN — SUVOREXANT PRN MG: 10 TABLET, FILM COATED ORAL at 22:24

## 2020-06-24 NOTE — PN
S CIWA





- CIWA Score


Nausea/Vomitin-No Nausea/No Vomiting


Muscle Tremors: 4-Moderate,w/Arms Extend


Anxiety: 4-Mod. Anxious/Guarded


Agitation: 3


Paroxysmal Sweats: 1-Minimal Palms Moist


Orientation: 0-Oriented


Tacttile Disturbances: 0-None


Auditory Disturbances: 0-None


Visual Disturbances: 0-None


Headache: 0-None Present


CIWA-Ar Total Score: 12





BHS Progress Note (SOAP)


Subjective: 


29 y/o admitted 20 with a hx of AURA-alcohol,cocaine on [facility]-MMTP 

with 100 mg methadone po daily. Hx Asthma,PTSD/Anxiety disorder.


Anxiety, sweats, irritability


Objective: 





20 11:44


                                   Vital Signs











  20





  05:46 09:14


 


Temperature 98.2 F 98.2 F


 


Pulse Rate 60 68


 


Respiratory 18 18





Rate  


 


Blood Pressure 100/58 L 102/57 L


 


O2 Sat by Pulse 96 96





Oximetry (%)  








                                Laboratory Tests











  20





  07:30 07:30


 


WBC  3.6 L 


 


RBC  4.96 


 


Hgb  13.2 


 


Hct  40.7 


 


MCV  82.0 


 


MCH  26.6 


 


MCHC  32.4 


 


RDW  17.5 H 


 


Plt Count  185 


 


MPV  9.1 


 


Sodium   140


 


Potassium   3.9


 


Chloride   106


 


Carbon Dioxide   30


 


Anion Gap   4 L


 


BUN   21.7 H


 


Creatinine   0.9


 


Est GFR (CKD-EPI)AfAm   98.75


 


Est GFR (CKD-EPI)NonAf   85.20


 


Random Glucose   87


 


Calcium   8.6


 


Total Bilirubin   0.8


 


AST   49 H


 


ALT   83 H


 


Alkaline Phosphatase   97


 


Total Protein   6.7


 


Albumin   3.1 L








covid-19 result pending


Alert o x 3


nad


oob ambulating with steady gait


lungs:cta,awais.














Assessment: 





20 11:45


withdrawal sx


Plan: 





cont detox


increase po fluids


maintain safety

## 2020-06-24 NOTE — PN
BHS Progress Note


Note: 





30  y.o.  female c/o  constipation  , states  she  does  not recall  when the 

last time  she had  a  bowel  movement . Also reports sweating , chills, body 

aches,


LMP  - "  years ago "   reports P5 youngest 1  y.o., 2  y.o.  . 


                               Vital Signs - 24 hr











  06/23/20 06/24/20 06/24/20





  20:25 00:30 03:30


 


Temperature 98.1 F  


 


Pulse Rate 58 L  


 


Respiratory 17 16 16





Rate   


 


Blood Pressure 102/57 L  


 


O2 Sat by Pulse 95  





Oximetry (%)   














  06/24/20 06/24/20 06/24/20





  05:46 09:14 12:24


 


Temperature 98.2 F 98.2 F 98.4 F


 


Pulse Rate 60 68 66


 


Respiratory 18 18 18





Rate   


 


Blood Pressure 100/58 L 102/57 L 100/50 L


 


O2 Sat by Pulse 96 96 96





Oximetry (%)   














  06/24/20 06/24/20 06/24/20





  15:42 16:30 17:55


 


Temperature  98.0 F 


 


Pulse Rate  67 78


 


Respiratory  18 





Rate   


 


Blood Pressure  96/60 104/60


 


O2 Sat by Pulse 95  





Oximetry (%)   








O  : Abdomen  soft  , mild  diffuse  tenderness  LUQ  





 A/P  :  Constipation - Lactulose 30  cc now  


Encouraged  p.o.  fluids  . pt  reports h/o  low  BP  during detox  . 


Advised pt of prn meds  and  encouraged to take  prn meds .
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
## 2020-06-25 RX ADMIN — METHADONE HYDROCHLORIDE SCH MG: 40 TABLET ORAL at 06:49

## 2020-06-25 RX ADMIN — METHOCARBAMOL PRN MG: 500 TABLET ORAL at 17:53

## 2020-06-25 RX ADMIN — NICOTINE SCH MG: 21 PATCH TRANSDERMAL at 10:38

## 2020-06-25 RX ADMIN — Medication SCH EACH: at 23:13

## 2020-06-25 RX ADMIN — IBUPROFEN PRN MG: 400 TABLET, FILM COATED ORAL at 16:19

## 2020-06-25 RX ADMIN — METHOCARBAMOL PRN MG: 500 TABLET ORAL at 23:14

## 2020-06-25 RX ADMIN — Medication SCH MG: at 23:14

## 2020-06-25 RX ADMIN — Medication SCH TAB: at 10:36

## 2020-06-25 RX ADMIN — LIDOCAINE SCH PATCH: 50 PATCH TOPICAL at 10:35

## 2020-06-25 RX ADMIN — SUVOREXANT PRN MG: 10 TABLET, FILM COATED ORAL at 23:15

## 2020-06-25 RX ADMIN — Medication SCH MG: at 23:13

## 2020-06-25 RX ADMIN — METHOCARBAMOL PRN MG: 500 TABLET ORAL at 10:35

## 2020-06-25 NOTE — EKG
Test Reason : 

Blood Pressure : ***/*** mmHG

Vent. Rate : 070 BPM     Atrial Rate : 070 BPM

   P-R Int : 140 ms          QRS Dur : 082 ms

    QT Int : 460 ms       P-R-T Axes : 054 -11 001 degrees

   QTc Int : 496 ms

 

NORMAL SINUS RHYTHM

MINIMAL VOLTAGE CRITERIA FOR LVH, MAY BE NORMAL VARIANT

PROLONGED QT

ABNORMAL ECG

WHEN COMPARED WITH ECG OF 12-APR-2019 14:46,

NO SIGNIFICANT CHANGE WAS FOUND

Confirmed by AMELIE ESCOTO, MALKA ([id number]) on 6/25/2020 5:40:51 PM

 

Referred By:             Confirmed By:MALKA KINSEY MD
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
## 2020-06-26 RX ADMIN — IBUPROFEN PRN MG: 400 TABLET, FILM COATED ORAL at 22:14

## 2020-06-26 RX ADMIN — LIDOCAINE SCH PATCH: 50 PATCH TOPICAL at 10:17

## 2020-06-26 RX ADMIN — Medication SCH TAB: at 10:17

## 2020-06-26 RX ADMIN — IBUPROFEN PRN MG: 400 TABLET, FILM COATED ORAL at 08:01

## 2020-06-26 RX ADMIN — GABAPENTIN SCH MG: 300 CAPSULE ORAL at 13:08

## 2020-06-26 RX ADMIN — Medication SCH EACH: at 22:16

## 2020-06-26 RX ADMIN — METHADONE HYDROCHLORIDE SCH MG: 40 TABLET ORAL at 06:26

## 2020-06-26 RX ADMIN — Medication SCH MG: at 22:14

## 2020-06-26 RX ADMIN — METHOCARBAMOL PRN MG: 500 TABLET ORAL at 06:28

## 2020-06-26 RX ADMIN — METHOCARBAMOL PRN MG: 500 TABLET ORAL at 17:59

## 2020-06-26 RX ADMIN — GABAPENTIN SCH MG: 300 CAPSULE ORAL at 22:15

## 2020-06-26 RX ADMIN — Medication SCH MG: at 22:15

## 2020-06-26 RX ADMIN — NICOTINE SCH MG: 21 PATCH TRANSDERMAL at 10:18

## 2020-06-26 RX ADMIN — SUVOREXANT PRN MG: 10 TABLET, FILM COATED ORAL at 22:14

## 2020-06-26 NOTE — PN
S CIWA





- CIWA Score


Nausea/Vomitin-Mild Nausea/No Vomiting


Muscle Tremors: 4-Moderate,w/Arms Extend


Anxiety: 4-Mod. Anxious/Guarded


Agitation: 3


Paroxysmal Sweats: 1-Minimal Palms Moist


Orientation: 0-Oriented


Tacttile Disturbances: 0-None


Auditory Disturbances: 0-None


Visual Disturbances: 0-None


Headache: 0-None Present


CIWA-Ar Total Score: 13





BHS Progress Note (SOAP)


Subjective: 





c/o anxiety


nausea,no vomiting


intermittent sleep





Objective: 





20 13:13


                                   Vital Signs











  20





  06:59 09:35


 


Temperature 98.0 F 97.3 F L


 


Pulse Rate 64 74


 


Respiratory 18 18





Rate  


 


Blood Pressure 104/58 L 103/62


 


O2 Sat by Pulse  98





Oximetry (%)  








                                Laboratory Tests











  20





  14:36 16:45 07:30


 


WBC   


 


RBC   


 


Hgb   


 


Hct   


 


MCV   


 


MCH   


 


MCHC   


 


RDW   


 


Plt Count   


 


MPV   


 


Sodium   


 


Potassium   


 


Chloride   


 


Carbon Dioxide   


 


Anion Gap   


 


BUN   


 


Creatinine   


 


Est GFR (CKD-EPI)AfAm   


 


Est GFR (CKD-EPI)NonAf   


 


Random Glucose   


 


Calcium   


 


Total Bilirubin   


 


AST   


 


ALT   


 


Alkaline Phosphatase   


 


Total Protein   


 


Albumin   


 


POC Urine HCG, Qual  Negative  


 


Syphilis Serology    Non-reactive


 


COVID-19 (ANA)   Not detected 


 


HIV Ag/Ab Combo Qual   














  20





  07:30 07:30 07:30


 


WBC   3.6 L 


 


RBC   4.96 


 


Hgb   13.2 


 


Hct   40.7 


 


MCV   82.0 


 


MCH   26.6 


 


MCHC   32.4 


 


RDW   17.5 H 


 


Plt Count   185 


 


MPV   9.1 


 


Sodium    140


 


Potassium    3.9


 


Chloride    106


 


Carbon Dioxide    30


 


Anion Gap    4 L


 


BUN    21.7 H


 


Creatinine    0.9


 


Est GFR (CKD-EPI)AfAm    98.75


 


Est GFR (CKD-EPI)NonAf    85.20


 


Random Glucose    87


 


Calcium    8.6


 


Total Bilirubin    0.8


 


AST    49 H


 


ALT    83 H


 


Alkaline Phosphatase    97


 


Total Protein    6.7


 


Albumin    3.1 L


 


POC Urine HCG, Qual   


 


Syphilis Serology   


 


COVID-19 (ANA)   


 


HIV Ag/Ab Combo Qual  Negative  











covid-19 not detected





Assessment: 





20 13:13


withdrwaal sx


nausea





Plan: 





continue detox


increase po fluids


maintain safety


Tigan prn as directed


psych f/u today

## 2020-06-26 NOTE — PN
Psychiatric Progress Note


Vital Signs: 


                                   Vital Signs











 Period  Temp  Pulse  Resp  BP Sys/Yusuf  Pulse Ox


 


 Last 24 Hr  97.1 F-98.0 F  62-82  17-18  /58-69  95-98











Date of Session: 06/26/20


Chief Complaint:: " I'm having anxiety."


HPI: Patient admitted to  for alcohol, cocaine, and opiate dependence. 

Consultation ordered due to complaints of anxiety.


ROS: Patient is ambulatory, alert +oriented X3.


Current Medications: 


Active Medications











Generic Name Dose Route Start Last Admin





  Trade Name Freq  PRN Reason Stop Dose Admin


 


Acetaminophen  650 mg  06/23/20 16:01 





  Tylenol -  PO  





  Q6H PRN  





  PAIN LEVEL 4 - 6  


 


Acetaminophen  650 mg  06/23/20 16:01 





  Tylenol -  PO  





  Q6H PRN  





  FEVER  


 


Al Hydroxide/Mg Hydroxide  30 ml  06/23/20 16:01  06/24/20 16:30





  Mylanta Oral Suspension -  PO   30 ml





  Q6H PRN   Administration





  DYSPEPSIA  


 


Bismuth Subsalicylate  524 mg  06/23/20 16:01 





  Pepto-Bismol -  PO  





  Q1H PRN  





  DIARRHEA  


 


Chlordiazepoxide HCl  10 mg  06/26/20 05:00  06/26/20 10:16





  Librium -  PO  06/26/20 23:01  10 mg





  Q3Z-UYS ALICE   Administration


 


Chlordiazepoxide HCl  10 mg  06/27/20 05:00 





  Librium -  PO  06/27/20 17:01 





  Q12H ALICE  


 


Chlordiazepoxide HCl  10 mg  06/26/20 00:00 





  Librium -  PO  06/27/20 00:00 





  Q4H PRN  





  WITHDRAWAL(CONT SUBST)  


 


Chlordiazepoxide HCl  10 mg  06/28/20 05:00 





  Librium -  PO  06/28/20 05:01 





  ONCE@0500 ONE  


 


Eucalyptus/Menthol/Phenol/Sorbitol  1 each  06/23/20 16:01 





  Cepastat Lozenge -  MM  06/29/20 16:01 





  Q4H PRN  





  SORE THROAT  


 


Ibuprofen  400 mg  06/23/20 16:01  06/26/20 08:01





  Motrin -  PO   400 mg





  Q6H PRN   Administration





  PAIN LEVEL 1 - 3  


 


Lactulose  20 gm  06/24/20 19:41  06/24/20 19:47





  Cephulac (Oral Use)  PO   20 gm





  QID PRN   Administration





  CONSTIPATION  


 


Lidocaine  1 patch  06/25/20 10:30  06/26/20 10:17





  Lidoderm Patch -  TP   1 patch





  DAILY ALICE   Administration


 


Magnesium Citrate  300 ml  06/23/20 16:01 





  Citroma -  PO  





  Q48H PRN  





  CONSTIPATION  


 


Magnesium Hydroxide  30 ml  06/23/20 16:01 





  Milk Of Magnesia -  PO  





  PRN PRN  





  CONSTIPATION  


 


Melatonin  5 mg  06/23/20 22:00  06/25/20 23:13





  Melatonin  PO   5 mg





  HS ALICE   Administration


 


Methadone HCl 80 mg/ Methadone  100 mg  06/25/20 06:00  06/26/20 06:26





  HCl 20 mg  PO  07/01/20 05:59  100 mg





  DAILY@0600 ALICE   Administration


 


Methocarbamol  500 mg  06/23/20 16:01  06/26/20 06:28





  Robaxin -  PO  06/29/20 16:01  500 mg





  Q6H PRN   Administration





  MUSCLE SPASMS  


 


Miscellaneous  1 each  06/25/20 22:00  06/25/20 23:13





  Lidoderm Patch Removal  MC   1 each





  DAILY@2200 ALICE   Administration


 


Nicotine  21 mg  06/24/20 11:57  06/26/20 10:18





  Nicoderm Patch -  TD   21 mg





  DAILY ALICE   Administration


 


Nicotine Polacrilex  4 mg  06/24/20 11:59 





  Nicorette Gum -  BUC  





  Q2H PRN  





  NICOTINE REPLACEMENT RX  


 


Prenatal Multivit/Folic Acid/Iron  1 tab  06/23/20 16:45  06/26/20 10:17





  Prenatal Vitamins (Sjr) -  PO   1 tab





  DAILY ALICE   Administration


 


Suvorexant  10 mg  06/24/20 22:00  06/25/20 23:15





  Belsomra  PO   10 mg





  HS PRN   Administration





  INSOMNIA  


 


Thiamine HCl  100 mg  06/23/20 22:00  06/25/20 23:14





  Vitamin B1 -  PO   100 mg





  HS ALICE   Administration











Medication(s) Change(s): Yes. Will add gabapentin 300mg TID.


Current Side Effect: No


Lab tests ordered: No


Lab tests reviewed: Yes


Provider note:: Patient reports having anxiety throughout the day. Vistaril was 

discontinued due to patient's EKG which reads on 6/23/20: QT//496- 

Prolong QTC. Ms. Bonner reports past history of accepting gabapentin with 

favorable effect. Will order Gabapentin 300m TID for anxiety. Benefits and side 

effects discussed. Verbal consent given.


Total face to face time:: 25





Mental Status Exam





- Mental Status Exam


Alert and Oriented to: Time, Place, Person


Cognitive Function: Good


Patient Appearance: Well Groomed


Mood: Hopeful


Affect: Appropriate


Patient Behavior: Appropriate, Cooperative


Speech Pattern: Appropriate


Voice Loudness: Normal


Thought Process: Intact, Goal Oriented


Thought Disorder: Not Present


Hallucinations: Denies


Suicidal Ideation: Denies


Homicidal Ideation: Denies


Insight/Judgement: Poor


Sleep: Fair


Appetite: Fair


Muscle strength/Tone: Normal


Gait/Station: Normal





Psychiatric Treatment Plan





- Problem List


(1) Substance induced mood disorder


Current Visit: Yes   





(2) Alcohol dependence with uncomplicated withdrawal


Current Visit: Yes   





(3) Cocaine dependence


Current Visit: Yes   


Qualifiers: 


   Substance use status: uncomplicated   Qualified Code(s): F14.20 - Cocaine 

dependence, uncomplicated   





(4) Opioid dependence on agonist therapy


Current Visit: Yes   Comment: on methadone maintance 120 mg qd, last dose today,

dose pending verification    





(5) PTSD (post-traumatic stress disorder)


Current Visit: No   





(6) Substance-induced sleep disorder


Current Visit: Yes

## 2020-06-27 RX ADMIN — METHOCARBAMOL PRN MG: 500 TABLET ORAL at 17:42

## 2020-06-27 RX ADMIN — GABAPENTIN SCH MG: 300 CAPSULE ORAL at 07:03

## 2020-06-27 RX ADMIN — NICOTINE SCH MG: 21 PATCH TRANSDERMAL at 10:31

## 2020-06-27 RX ADMIN — IBUPROFEN PRN MG: 400 TABLET, FILM COATED ORAL at 04:01

## 2020-06-27 RX ADMIN — Medication SCH EACH: at 23:58

## 2020-06-27 RX ADMIN — Medication SCH: at 10:33

## 2020-06-27 RX ADMIN — IBUPROFEN PRN MG: 400 TABLET, FILM COATED ORAL at 13:46

## 2020-06-27 RX ADMIN — METHADONE HYDROCHLORIDE SCH MG: 40 TABLET ORAL at 07:02

## 2020-06-27 RX ADMIN — METHOCARBAMOL PRN MG: 500 TABLET ORAL at 10:33

## 2020-06-27 RX ADMIN — Medication SCH MG: at 22:48

## 2020-06-27 RX ADMIN — LIDOCAINE SCH PATCH: 50 PATCH TOPICAL at 10:32

## 2020-06-27 RX ADMIN — METHOCARBAMOL PRN MG: 500 TABLET ORAL at 22:51

## 2020-06-27 RX ADMIN — GABAPENTIN SCH MG: 300 CAPSULE ORAL at 13:45

## 2020-06-27 RX ADMIN — SUVOREXANT PRN MG: 10 TABLET, FILM COATED ORAL at 22:48

## 2020-06-27 RX ADMIN — GABAPENTIN SCH MG: 300 CAPSULE ORAL at 22:48

## 2020-06-27 NOTE — PN
BHS Progress Note


Note: 


Psychiatric nurse practitoner note:


Patient scheduled for discharge tomorrow morning. A 2 week supply of Gabapentin 

300mg TID was electronically sent to [facility] at [address] [place] [address]. [place] [address].

## 2020-06-27 NOTE — PN
[facility] CIWA





- CIWA Score


Nausea/Vomitin-No Nausea/No Vomiting


Muscle Tremors: None


Anxiety: 2


Agitation: 0-Normal Activity


Paroxysmal Sweats: 2


Orientation: 0-Oriented


Tacttile Disturbances: 0-None


Auditory Disturbances: 0-None


Visual Disturbances: 0-None


Headache: 0-None Present


CIWA-Ar Total Score: 4





BHS Progress Note (SOAP)


Subjective: 





c/o mild withdrawal symptoms.





Objective: 





20 14:30


                                   Vital Signs











  20





  07:00 09:45 13:20


 


Temperature 98.5 F 98.4 F 98.5 F


 


Pulse Rate 79 81 90


 


Respiratory 18 18 18





Rate   


 


Blood Pressure 100/63 103/68 112/64


 


O2 Sat by Pulse  98 96





Oximetry (%)   








                             Laboratory Last Values











WBC  3.6 K/mm3 (4.0-10.0)  L  20  07:30    


 


RBC  4.96 M/mm3 (3.60-5.2)   20  07:30    


 


Hgb  13.2 GM/dL (10.7-15.3)   20  07:30    


 


Hct  40.7 % (32.4-45.2)   20  07:30    


 


MCV  82.0 fl (80-96)   20  07:30    


 


MCH  26.6 pg (25.7-33.7)   20  07:30    


 


MCHC  32.4 g/dl (32.0-36.0)   20  07:30    


 


RDW  17.5 % (11.6-15.6)  H  20  07:30    


 


Plt Count  185 K/MM3 (134-434)   20  07:30    


 


MPV  9.1 fl (7.5-11.1)   20  07:30    


 


Sodium  140 mmol/L (136-145)   20  07:30    


 


Potassium  3.9 mmol/L (3.5-5.1)   20  07:30    


 


Chloride  106 mmol/L ()   20  07:30    


 


Carbon Dioxide  30 mmol/L (21-32)   20  07:30    


 


Anion Gap  4 MMOL/L (8-16)  L  06/24/20  07:30    


 


BUN  21.7 mg/dL (7-18)  H  20  07:30    


 


Creatinine  0.9 mg/dL (0.55-1.3)   20  07:30    


 


Est GFR (CKD-EPI)AfAm  98.75   20  07:30    


 


Est GFR (CKD-EPI)NonAf  85.20   20  07:30    


 


Random Glucose  87 mg/dL ()   20  07:30    


 


Calcium  8.6 mg/dL (8.5-10.1)   20  07:30    


 


Total Bilirubin  0.8 mg/dL (0.2-1)   20  07:30    


 


AST  49 U/L (15-37)  H  20  07:30    


 


ALT  83 U/L (13-61)  H  20  07:30    


 


Alkaline Phosphatase  97 U/L ()   20  07:30    


 


Total Protein  6.7 g/dl (6.4-8.2)   20  07:30    


 


Albumin  3.1 g/dl (3.4-5.0)  L  20  07:30    


 


POC Urine HCG, Qual  Negative   20  14:36    


 


Syphilis Serology  Non-reactive  (NONREACTIVE)   20  07:30    


 


COVID-19 (ANA)  Not detected  (Not Detected)   20  16:45    


 


HIV Ag/Ab Combo Qual  Negative  (NEGATIVE)   20  07:30    








Labs noted.


Assessment: 





20 14:30


AOX3, in no acute respiratory distress.


Full ROM, ambulating in the unit.


Mild withdrawal symptoms.


For d/c tomorrow.


Plan: 





continue detox.


D/c in AM.

## 2020-06-28 VITALS — TEMPERATURE: 98.9 F | DIASTOLIC BLOOD PRESSURE: 66 MMHG | SYSTOLIC BLOOD PRESSURE: 99 MMHG | HEART RATE: 89 BPM

## 2020-06-28 LAB
BACTERIA # UR AUTO: 114.7 /UL (ref 0–1359)
CASTS URNS QL MICRO: 1.54 /UL (ref 0–3.1)
EPITH CASTS URNS QL MICRO: 24.1 /UL (ref 0–25.1)
LEUKOCYTE ESTERASE UR QL STRIP.AUTO: (no result)
PH UR: 5 [PH] (ref 5–8)
RBC # BLD AUTO: 11.5 /UL (ref 0–23.9)
SP GR UR: 1.02 (ref 1.01–1.03)
UROBILINOGEN UR STRIP-MCNC: 0.2 MG/DL (ref 0.2–1)
WBC # UR AUTO: 214.8 /UL (ref 0–25.8)

## 2020-06-28 RX ADMIN — GABAPENTIN SCH MG: 300 CAPSULE ORAL at 06:40

## 2020-06-28 RX ADMIN — METHADONE HYDROCHLORIDE SCH MG: 40 TABLET ORAL at 06:41

## 2020-06-28 RX ADMIN — NICOTINE SCH: 21 PATCH TRANSDERMAL at 09:18

## 2020-06-28 RX ADMIN — IBUPROFEN PRN MG: 400 TABLET, FILM COATED ORAL at 06:39

## 2020-06-28 RX ADMIN — Medication SCH TAB: at 09:18

## 2020-06-28 RX ADMIN — LIDOCAINE SCH PATCH: 50 PATCH TOPICAL at 09:18

## 2020-06-28 NOTE — DS
[facility] Detox Discharge Summary


Admission Date: 


06/23/20





Discharge Date: 06/28/20





- History


Present History: Alcohol Dependence, Opioid Dependence, MMTP


Additional Comments: 





Patient completed detox successfully, discharged safely in stable condition. 

Instructed to follow up with PCP within 1 week. Noted with abnormal UA (possible

UTI, contacted patient twice at [phone] but phone didn't ring). Patient to 

follow up with PCP for all abnormal lab results as instructed upon discharge. 


Pertinent Past History: 





Asthma





HCV





- Physical Exam Results


Vital Signs: 


                                   Vital Signs











Temperature  98.9 F   06/28/20 07:00


 


Pulse Rate  89   06/28/20 07:00


 


Respiratory Rate  18   06/28/20 07:00


 


Blood Pressure  99/66   06/28/20 07:00


 


O2 Sat by Pulse Oximetry (%)  97   06/28/20 07:00











Pertinent Admission Physical Exam Findings: 





Withdrawal sxs





                                Laboratory Tests











  06/23/20 06/23/20 06/24/20





  14:36 16:45 07:30


 


WBC   


 


RBC   


 


Hgb   


 


Hct   


 


MCV   


 


MCH   


 


MCHC   


 


RDW   


 


Plt Count   


 


MPV   


 


Sodium   


 


Potassium   


 


Chloride   


 


Carbon Dioxide   


 


Anion Gap   


 


BUN   


 


Creatinine   


 


Est GFR (CKD-EPI)AfAm   


 


Est GFR (CKD-EPI)NonAf   


 


Random Glucose   


 


Calcium   


 


Total Bilirubin   


 


AST   


 


ALT   


 


Alkaline Phosphatase   


 


Total Protein   


 


Albumin   


 


Urine Color   


 


Urine Appearance   


 


Urine pH   


 


Ur Specific Gravity   


 


Urine Protein   


 


Urine Glucose (UA)   


 


Urine Ketones   


 


Urine Blood   


 


Urine Nitrite   


 


Urine Bilirubin   


 


Urine Urobilinogen   


 


Ur Leukocyte Esterase   


 


Urine WBC (Auto)   


 


Urine RBC (Auto)   


 


Urine Casts (Auto)   


 


U Epithel Cells (Auto)   


 


Urine Bacteria (Auto)   


 


POC Urine HCG, Qual  Negative  


 


Syphilis Serology    Non-reactive


 


COVID-19 (ANA)   Not detected 


 


HIV Ag/Ab Combo Qual   














  06/24/20 06/24/20 06/24/20





  07:30 07:30 07:30


 


WBC   3.6 L 


 


RBC   4.96 


 


Hgb   13.2 


 


Hct   40.7 


 


MCV   82.0 


 


MCH   26.6 


 


MCHC   32.4 


 


RDW   17.5 H 


 


Plt Count   185 


 


MPV   9.1 


 


Sodium    140


 


Potassium    3.9


 


Chloride    106


 


Carbon Dioxide    30


 


Anion Gap    4 L


 


BUN    21.7 H


 


Creatinine    0.9


 


Est GFR (CKD-EPI)AfAm    98.75


 


Est GFR (CKD-EPI)NonAf    85.20


 


Random Glucose    87


 


Calcium    8.6


 


Total Bilirubin    0.8


 


AST    49 H


 


ALT    83 H


 


Alkaline Phosphatase    97


 


Total Protein    6.7


 


Albumin    3.1 L


 


Urine Color   


 


Urine Appearance   


 


Urine pH   


 


Ur Specific Gravity   


 


Urine Protein   


 


Urine Glucose (UA)   


 


Urine Ketones   


 


Urine Blood   


 


Urine Nitrite   


 


Urine Bilirubin   


 


Urine Urobilinogen   


 


Ur Leukocyte Esterase   


 


Urine WBC (Auto)   


 


Urine RBC (Auto)   


 


Urine Casts (Auto)   


 


U Epithel Cells (Auto)   


 


Urine Bacteria (Auto)   


 


POC Urine HCG, Qual   


 


Syphilis Serology   


 


COVID-19 (ANA)   


 


HIV Ag/Ab Combo Qual  Negative  














  06/28/20





  00:05


 


WBC 


 


RBC 


 


Hgb 


 


Hct 


 


MCV 


 


MCH 


 


MCHC 


 


RDW 


 


Plt Count 


 


MPV 


 


Sodium 


 


Potassium 


 


Chloride 


 


Carbon Dioxide 


 


Anion Gap 


 


BUN 


 


Creatinine 


 


Est GFR (CKD-EPI)AfAm 


 


Est GFR (CKD-EPI)NonAf 


 


Random Glucose 


 


Calcium 


 


Total Bilirubin 


 


AST 


 


ALT 


 


Alkaline Phosphatase 


 


Total Protein 


 


Albumin 


 


Urine Color  Yellow


 


Urine Appearance  Clear


 


Urine pH  5.0


 


Ur Specific Gravity  1.020


 


Urine Protein  Negative


 


Urine Glucose (UA)  Negative


 


Urine Ketones  Negative


 


Urine Blood  Negative


 


Urine Nitrite  Negative


 


Urine Bilirubin  Negative


 


Urine Urobilinogen  0.2


 


Ur Leukocyte Esterase  2+ H


 


Urine WBC (Auto)  214.8


 


Urine RBC (Auto)  11.5


 


Urine Casts (Auto)  1.54


 


U Epithel Cells (Auto)  24.1


 


Urine Bacteria (Auto)  114.7


 


POC Urine HCG, Qual 


 


Syphilis Serology 


 


COVID-19 (ANA) 


 


HIV Ag/Ab Combo Qual 








Labs reviewed: azotemia, transaminitis, abnormal UA (UTI)





- Treatment


Hospital Course: Detox Protocol Followed, Detoxed Safely, Responded well, 

Discharged Condition Good





- Medication


Discharge Medications: 


Ambulatory Orders





Albuterol Sulfate Inhaler - [Ventolin Hfa Inhaler -] 2 inh PO Q4H PRN 06/23/20 


Gabapentin [Neurontin -] 300 mg PO TID #42 capsule 06/27/20 











- Diagnosis


(1) Hepatitis C


Status: Chronic   





(2) Azotemia


Status: Acute   





(3) Transaminitis


Status: Acute   





(4) Alcohol dependence with uncomplicated withdrawal


Status: Acute   





(5) Asthma


Status: Chronic   


Qualifiers: 


   Asthma severity: mild   Asthma persistence: intermittent   Asthma 

complication type: uncomplicated   Qualified Code(s): J45.20 - Mild intermittent

asthma, uncomplicated   





(6) Opioid dependence on agonist therapy


Status: Chronic   





(7) PTSD (post-traumatic stress disorder)


Status: Chronic   





(8) Abnormal finding on urinalysis


Status: Acute   





- AMA


Did Patient Leave Against Medical Advice: No (Follow up with PCP within 1 week)

## 2021-06-17 ENCOUNTER — HOSPITAL ENCOUNTER (EMERGENCY)
Facility: HOSPITAL | Age: 31
Discharge: HOME | End: 2021-06-17
Attending: EMERGENCY MEDICINE | Admitting: EMERGENCY MEDICINE

## 2021-06-17 ENCOUNTER — APPOINTMENT (OUTPATIENT)
Dept: GENERAL RADIOLOGY | Facility: HOSPITAL | Age: 31
End: 2021-06-17

## 2021-06-17 VITALS
WEIGHT: 135.58 LBS | TEMPERATURE: 99 F | HEIGHT: 63 IN | DIASTOLIC BLOOD PRESSURE: 72 MMHG | RESPIRATION RATE: 14 BRPM | SYSTOLIC BLOOD PRESSURE: 137 MMHG | OXYGEN SATURATION: 99 % | HEART RATE: 36 BPM | BODY MASS INDEX: 24.02 KG/M2

## 2021-06-17 DIAGNOSIS — R07.89 ATYPICAL CHEST PAIN: Primary | ICD-10-CM

## 2021-06-17 DIAGNOSIS — K29.00 ACUTE GASTRITIS WITHOUT HEMORRHAGE, UNSPECIFIED GASTRITIS TYPE: ICD-10-CM

## 2021-06-17 LAB
ALBUMIN SERPL-MCNC: 4.4 G/DL (ref 3.5–5.2)
ALBUMIN/GLOB SERPL: 1 G/DL
ALP SERPL-CCNC: 99 U/L (ref 39–117)
ALT SERPL W P-5'-P-CCNC: 243 U/L (ref 1–33)
ANION GAP SERPL CALCULATED.3IONS-SCNC: 12.2 MMOL/L (ref 5–15)
AST SERPL-CCNC: 81 U/L (ref 1–32)
BASOPHILS # BLD AUTO: 0.03 10*3/MM3 (ref 0–0.2)
BASOPHILS NFR BLD AUTO: 0.3 % (ref 0–1.5)
BILIRUB SERPL-MCNC: 1.3 MG/DL (ref 0–1.2)
BUN SERPL-MCNC: 19 MG/DL (ref 6–20)
BUN/CREAT SERPL: 22.9 (ref 7–25)
CALCIUM SPEC-SCNC: 9.3 MG/DL (ref 8.6–10.5)
CHLORIDE SERPL-SCNC: 102 MMOL/L (ref 98–107)
CK MB SERPL-CCNC: 1.54 NG/ML
CK SERPL-CCNC: 71 U/L (ref 20–180)
CO2 SERPL-SCNC: 21.8 MMOL/L (ref 22–29)
CREAT SERPL-MCNC: 0.83 MG/DL (ref 0.57–1)
DEPRECATED RDW RBC AUTO: 48 FL (ref 37–54)
EOSINOPHIL # BLD AUTO: 0.05 10*3/MM3 (ref 0–0.4)
EOSINOPHIL NFR BLD AUTO: 0.5 % (ref 0.3–6.2)
ERYTHROCYTE [DISTWIDTH] IN BLOOD BY AUTOMATED COUNT: 15.9 % (ref 12.3–15.4)
GFR SERPL CREATININE-BSD FRML MDRD: 80 ML/MIN/1.73
GFR SERPL CREATININE-BSD FRML MDRD: 97 ML/MIN/1.73
GLOBULIN UR ELPH-MCNC: 4.2 GM/DL
GLUCOSE SERPL-MCNC: 98 MG/DL (ref 65–99)
HCT VFR BLD AUTO: 42.4 % (ref 34–46.6)
HGB BLD-MCNC: 13.9 G/DL (ref 12–15.9)
HOLD SPECIMEN: NORMAL
IMM GRANULOCYTES # BLD AUTO: 0.03 10*3/MM3 (ref 0–0.05)
IMM GRANULOCYTES NFR BLD AUTO: 0.3 % (ref 0–0.5)
LIPASE SERPL-CCNC: 30 U/L (ref 13–60)
LYMPHOCYTES # BLD AUTO: 3 10*3/MM3 (ref 0.7–3.1)
LYMPHOCYTES NFR BLD AUTO: 29.7 % (ref 19.6–45.3)
MAGNESIUM SERPL-MCNC: 2.1 MG/DL (ref 1.6–2.6)
MCH RBC QN AUTO: 27.6 PG (ref 26.6–33)
MCHC RBC AUTO-ENTMCNC: 32.8 G/DL (ref 31.5–35.7)
MCV RBC AUTO: 84.1 FL (ref 79–97)
MONOCYTES # BLD AUTO: 0.6 10*3/MM3 (ref 0.1–0.9)
MONOCYTES NFR BLD AUTO: 5.9 % (ref 5–12)
NEUTROPHILS NFR BLD AUTO: 6.39 10*3/MM3 (ref 1.7–7)
NEUTROPHILS NFR BLD AUTO: 63.3 % (ref 42.7–76)
NRBC BLD AUTO-RTO: 0 /100 WBC (ref 0–0.2)
NT-PROBNP SERPL-MCNC: 54.3 PG/ML (ref 0–450)
PLATELET # BLD AUTO: 204 10*3/MM3 (ref 140–450)
PMV BLD AUTO: 11.4 FL (ref 6–12)
POTASSIUM SERPL-SCNC: 3.5 MMOL/L (ref 3.5–5.2)
PROT SERPL-MCNC: 8.6 G/DL (ref 6–8.5)
RBC # BLD AUTO: 5.04 10*6/MM3 (ref 3.77–5.28)
SODIUM SERPL-SCNC: 136 MMOL/L (ref 136–145)
TROPONIN I SERPL-MCNC: 0 NG/ML (ref 0–0.6)
TROPONIN I SERPL-MCNC: 0 NG/ML (ref 0–0.6)
WBC # BLD AUTO: 10.1 10*3/MM3 (ref 3.4–10.8)
WHOLE BLOOD HOLD SPECIMEN: NORMAL

## 2021-06-17 PROCEDURE — 99284 EMERGENCY DEPT VISIT MOD MDM: CPT

## 2021-06-17 PROCEDURE — 93005 ELECTROCARDIOGRAM TRACING: CPT | Performed by: EMERGENCY MEDICINE

## 2021-06-17 PROCEDURE — 82553 CREATINE MB FRACTION: CPT | Performed by: EMERGENCY MEDICINE

## 2021-06-17 PROCEDURE — 84484 ASSAY OF TROPONIN QUANT: CPT

## 2021-06-17 PROCEDURE — 83880 ASSAY OF NATRIURETIC PEPTIDE: CPT | Performed by: EMERGENCY MEDICINE

## 2021-06-17 PROCEDURE — 83690 ASSAY OF LIPASE: CPT | Performed by: EMERGENCY MEDICINE

## 2021-06-17 PROCEDURE — 85025 COMPLETE CBC W/AUTO DIFF WBC: CPT | Performed by: EMERGENCY MEDICINE

## 2021-06-17 PROCEDURE — 93010 ELECTROCARDIOGRAM REPORT: CPT | Performed by: INTERNAL MEDICINE

## 2021-06-17 PROCEDURE — 83735 ASSAY OF MAGNESIUM: CPT | Performed by: EMERGENCY MEDICINE

## 2021-06-17 PROCEDURE — 80053 COMPREHEN METABOLIC PANEL: CPT | Performed by: EMERGENCY MEDICINE

## 2021-06-17 PROCEDURE — 96374 THER/PROPH/DIAG INJ IV PUSH: CPT

## 2021-06-17 PROCEDURE — 71045 X-RAY EXAM CHEST 1 VIEW: CPT

## 2021-06-17 PROCEDURE — 82550 ASSAY OF CK (CPK): CPT | Performed by: EMERGENCY MEDICINE

## 2021-06-17 RX ORDER — SODIUM CHLORIDE 0.9 % (FLUSH) 0.9 %
10 SYRINGE (ML) INJECTION AS NEEDED
Status: DISCONTINUED | OUTPATIENT
Start: 2021-06-17 | End: 2021-06-17 | Stop reason: HOSPADM

## 2021-06-17 RX ORDER — FAMOTIDINE 10 MG/ML
20 INJECTION, SOLUTION INTRAVENOUS ONCE
Status: COMPLETED | OUTPATIENT
Start: 2021-06-17 | End: 2021-06-17

## 2021-06-17 RX ORDER — ONDANSETRON 4 MG/1
4 TABLET, ORALLY DISINTEGRATING ORAL EVERY 8 HOURS PRN
Qty: 21 TABLET | Refills: 0 | Status: SHIPPED | OUTPATIENT
Start: 2021-06-17 | End: 2021-06-24

## 2021-06-17 RX ORDER — LIDOCAINE HYDROCHLORIDE 20 MG/ML
15 SOLUTION OROPHARYNGEAL ONCE
Status: COMPLETED | OUTPATIENT
Start: 2021-06-17 | End: 2021-06-17

## 2021-06-17 RX ORDER — ALUMINA, MAGNESIA, AND SIMETHICONE 2400; 2400; 240 MG/30ML; MG/30ML; MG/30ML
15 SUSPENSION ORAL ONCE
Status: COMPLETED | OUTPATIENT
Start: 2021-06-17 | End: 2021-06-17

## 2021-06-17 RX ORDER — PANTOPRAZOLE SODIUM 40 MG/1
40 TABLET, DELAYED RELEASE ORAL DAILY
Qty: 15 TABLET | Refills: 0 | Status: SHIPPED | OUTPATIENT
Start: 2021-06-17 | End: 2021-07-02

## 2021-06-17 RX ORDER — IBUPROFEN 600 MG/1
600 TABLET ORAL ONCE
Status: COMPLETED | OUTPATIENT
Start: 2021-06-17 | End: 2021-06-17

## 2021-06-17 RX ORDER — ASPIRIN 81 MG/1
324 TABLET, CHEWABLE ORAL ONCE
Status: DISCONTINUED | OUTPATIENT
Start: 2021-06-17 | End: 2021-06-17

## 2021-06-17 RX ADMIN — MAGNESIUM HYDROXIDE,ALUMINUM HYDROXICE,SIMETHICONE 15 ML: 240; 2400; 2400 SUSPENSION ORAL at 05:56

## 2021-06-17 RX ADMIN — FAMOTIDINE 20 MG: 10 INJECTION INTRAVENOUS at 01:45

## 2021-06-17 RX ADMIN — IBUPROFEN 600 MG: 600 TABLET, FILM COATED ORAL at 05:08

## 2021-06-17 RX ADMIN — MAGNESIUM HYDROXIDE,ALUMINUM HYDROXICE,SIMETHICONE 15 ML: 240; 2400; 2400 SUSPENSION ORAL at 01:41

## 2021-06-17 RX ADMIN — LIDOCAINE HYDROCHLORIDE 15 ML: 20 SOLUTION ORAL; TOPICAL at 05:56

## 2021-06-17 RX ADMIN — LIDOCAINE HYDROCHLORIDE 15 ML: 20 SOLUTION ORAL; TOPICAL at 01:41

## 2021-06-17 NOTE — ED PROVIDER NOTES
Subjective   Patient is a 31-yo female who presents to the ED with c/o chest pain and abdominal pain. Pt is coming from [place] and just got there yesterday. Pt is there for recovery for spice and heroin. Pt is an IV drug user. Pt states the abdominal pain radiates to her chest and has been constant. Pt has felt short of breath. Pt has had similar symptoms previously.     Pt has Hepatitis C and asthma.      History provided by:  Patient   used: No    Chest Pain  Associated symptoms: abdominal pain (radiates to chest) and shortness of breath    Associated symptoms: no back pain, no cough, no fever and no vomiting    Risk factors: smoking    Abdominal Pain  Associated symptoms: shortness of breath    Associated symptoms: no chest pain, no chills, no cough, no diarrhea, no dysuria, no fever and no vomiting        Review of Systems   Constitutional: Negative for chills and fever.   HENT: Negative for nosebleeds.    Eyes: Negative for redness.   Respiratory: Positive for shortness of breath. Negative for cough.    Cardiovascular: Negative for chest pain.   Gastrointestinal: Positive for abdominal pain (radiates to chest). Negative for diarrhea and vomiting.   Genitourinary: Negative for dysuria and frequency.   Musculoskeletal: Negative for back pain and neck pain.   Skin: Negative for rash.   Neurological: Negative for seizures and syncope.       Past Medical History:   Diagnosis Date   • Asthma    • Hepatitis C virus    • IV drug user    • Sepsis (CMS/HCC)        Allergies   Allergen Reactions   • Tramadol Hcl Seizure       Past Surgical History:   Procedure Laterality Date   •  SECTION      X5   • GANGLION CYST EXCISION         History reviewed. No pertinent family history.    Social History     Socioeconomic History   • Marital status: Single     Spouse name: Not on file   • Number of children: Not on file   • Years of education: Not on file   • Highest education level: Not on file    Tobacco Use   • Smoking status: Current Every Day Smoker   • Smokeless tobacco: Never Used   • Tobacco comment: 5 CIGARETTES/DAY   Substance and Sexual Activity   • Alcohol use: Yes     Comment: OCC   • Drug use: Not Currently     Comment: LAST USED HEROIN IV ABOUT 3 DAYS AGO         Objective   Physical Exam  Vitals and nursing note reviewed.   Constitutional:       General: She is awake. She is not in acute distress.     Appearance: Normal appearance.   HENT:      Head: Normocephalic and atraumatic.      Nose: Nose normal.      Mouth/Throat:      Pharynx: Oropharynx is clear.   Eyes:      General: No scleral icterus.     Conjunctiva/sclera: Conjunctivae normal.      Pupils: Pupils are equal, round, and reactive to light.   Cardiovascular:      Rate and Rhythm: Normal rate and regular rhythm.      Pulses: Normal pulses.      Heart sounds: Normal heart sounds. No murmur heard.        Comments: Good pulses.  Pulmonary:      Effort: Pulmonary effort is normal. No respiratory distress.      Breath sounds: Decreased breath sounds (slight) and wheezing (mild bilateral) present. No rhonchi or rales.      Comments: Pain is reproducible with deep inspiration.   Chest:      Chest wall: No tenderness.   Abdominal:      Palpations: Abdomen is soft.      Tenderness: There is abdominal tenderness in the epigastric area. There is no guarding or rebound.      Hernia: No hernia is present.      Comments: No rigidity. No prominent aortic pulsations.    Musculoskeletal:         General: No tenderness. Normal range of motion.      Cervical back: Normal range of motion and neck supple. No tenderness.      Right lower leg: No edema.      Left lower leg: No edema.      Comments: Old scarring and old puncture wounds on bilateral arms and forearms. No erythema. Non-tender.    Skin:     General: Skin is warm and dry.   Neurological:      General: No focal deficit present.      Mental Status: She is alert and oriented to person, place, and  time.      Sensory: Sensation is intact. No sensory deficit.      Motor: Motor function is intact. No weakness.   Psychiatric:         Mood and Affect: Mood normal.         Behavior: Behavior normal.         Procedures         ED Course  ED Course as of Jun 17 0312   Thu Jun 17, 2021   0100 Orthostatic vital signs were reviewed and within normal limits.     [JW]   0121 EKG:    Rhythm: Sinus bradycardia  Rate: 42  Intervals: Short ND interval normal QT interval  T-wave: Inverted T waves in V2, V3, T wave flattening in V4, V5, V6, III and aVF  ST Segment: No obvious ST elevation or ST depression    EKG Comparison: Unavailable    Interpreted by me        [SD]   0226 Patient was given a GI cocktail and reevaluated by nursing staff approximately 20 minutes after administration.  Upon reevaluation the patient states that she received significant relief from the GI cocktail and pain is significantly improved    [SD]   0303 EKG:    Rhythm: Sinus bradycardia  Rate: 38  Intervals:, Short ND interval normal QTC  T-wave: Inverted T waves in V2, V3, nonspecific T wave flattening in III, aVF  ST Segment: No obvious ST elevation or ST depression    EKG Comparison: Unchanged from previous EKG performed in the ER today    Interpreted by me       [SD]      ED Course User Index  [JW] Kim Jasso  [SD] Tobin Barerra,                      HEART Score: 2                  PERC Rule (for pulmonary embolism) reviewed and/or performed as part of the patient evaluation and treatment planning process.  The result associated with this review/performance is: 0    Wells' Criteria (for pulmonary embolism) reviewed and/or performed as part of the patient evaluation and treatment planning process.  The result associated with this review/performance is: 0           MDM  Number of Diagnoses or Management Options  Diagnosis management comments: The patient appears well, no distress and nontoxic.  The patient is resting comfortable.  The  patient has a low heart score.  The patient understands that they have a low risk for cardiovascular event over the next 6 weeks.  Understanding the risks they feel comfortable to be discharged home and to follow-up with the cardiologist on outpatient basis.  I discussed the case with the on-call cardiologist who feels this patient can be evaluated outpatient basis as well.  In the interim, the patient does understand should they develop worsening chest pain, near syncope, syncope, extreme fatigue, worsening shortness of breath diaphoresis to return back to emergency room immediately      Final diagnoses:   Atypical chest pain   Acute gastritis without hemorrhage, unspecified gastritis type       Documentation assistance provided by jolene Jasso.  Information recorded by the deborahiblorena was done at my direction and has been verified and validated by me.     Kim Jasso  06/17/21 0055       Kim Jasso  06/17/21 0059       Tobin Barrera DO  06/18/21 0119

## 2021-06-17 NOTE — DISCHARGE INSTRUCTIONS
No strenuous activity until released by your cardiologist discussed possible need for gastroenterology referral for EGD with your primary care physician.  Please return to the emergency room for return of chest pain, chest pressure, shortness of breath, near passing out, passing out extreme fatigue, diaphoresis or nausea and vomiting

## 2021-06-18 LAB
QT INTERVAL: 538 MS
QT INTERVAL: 547 MS

## 2021-06-22 ENCOUNTER — HOSPITAL ENCOUNTER (EMERGENCY)
Facility: HOSPITAL | Age: 31
Discharge: HOME | End: 2021-06-22
Attending: EMERGENCY MEDICINE | Admitting: EMERGENCY MEDICINE

## 2021-06-22 ENCOUNTER — APPOINTMENT (OUTPATIENT)
Dept: GENERAL RADIOLOGY | Facility: HOSPITAL | Age: 31
End: 2021-06-22

## 2021-06-22 VITALS
OXYGEN SATURATION: 98 % | WEIGHT: 135.58 LBS | BODY MASS INDEX: 24.02 KG/M2 | HEART RATE: 50 BPM | TEMPERATURE: 98 F | RESPIRATION RATE: 12 BRPM | SYSTOLIC BLOOD PRESSURE: 106 MMHG | DIASTOLIC BLOOD PRESSURE: 58 MMHG | HEIGHT: 63 IN

## 2021-06-22 DIAGNOSIS — M94.0 COSTOCHONDRITIS: Primary | ICD-10-CM

## 2021-06-22 DIAGNOSIS — R00.1 SINUS BRADYCARDIA: ICD-10-CM

## 2021-06-22 LAB
ALBUMIN SERPL-MCNC: 3.9 G/DL (ref 3.5–5.2)
ALBUMIN/GLOB SERPL: 1.1 G/DL
ALP SERPL-CCNC: 90 U/L (ref 39–117)
ALT SERPL W P-5'-P-CCNC: 179 U/L (ref 1–33)
ANION GAP SERPL CALCULATED.3IONS-SCNC: 8.7 MMOL/L (ref 5–15)
AST SERPL-CCNC: 78 U/L (ref 1–32)
BASOPHILS # BLD AUTO: 0.04 10*3/MM3 (ref 0–0.2)
BASOPHILS NFR BLD AUTO: 0.6 % (ref 0–1.5)
BILIRUB SERPL-MCNC: 0.4 MG/DL (ref 0–1.2)
BUN SERPL-MCNC: 20 MG/DL (ref 6–20)
BUN/CREAT SERPL: 23.8 (ref 7–25)
CALCIUM SPEC-SCNC: 9 MG/DL (ref 8.6–10.5)
CHLORIDE SERPL-SCNC: 105 MMOL/L (ref 98–107)
CK MB SERPL-CCNC: 7.11 NG/ML
CK SERPL-CCNC: 217 U/L (ref 20–180)
CO2 SERPL-SCNC: 27.3 MMOL/L (ref 22–29)
CREAT SERPL-MCNC: 0.84 MG/DL (ref 0.57–1)
DEPRECATED RDW RBC AUTO: 45.8 FL (ref 37–54)
EOSINOPHIL # BLD AUTO: 0.1 10*3/MM3 (ref 0–0.4)
EOSINOPHIL NFR BLD AUTO: 1.4 % (ref 0.3–6.2)
ERYTHROCYTE [DISTWIDTH] IN BLOOD BY AUTOMATED COUNT: 15.1 % (ref 12.3–15.4)
GFR SERPL CREATININE-BSD FRML MDRD: 79 ML/MIN/1.73
GFR SERPL CREATININE-BSD FRML MDRD: 95 ML/MIN/1.73
GLOBULIN UR ELPH-MCNC: 3.5 GM/DL
GLUCOSE SERPL-MCNC: 91 MG/DL (ref 65–99)
HCT VFR BLD AUTO: 38 % (ref 34–46.6)
HGB BLD-MCNC: 12.2 G/DL (ref 12–15.9)
HOLD SPECIMEN: NORMAL
HOLD SPECIMEN: NORMAL
IMM GRANULOCYTES # BLD AUTO: 0.01 10*3/MM3 (ref 0–0.05)
IMM GRANULOCYTES NFR BLD AUTO: 0.1 % (ref 0–0.5)
LIPASE SERPL-CCNC: 23 U/L (ref 13–60)
LYMPHOCYTES # BLD AUTO: 2.12 10*3/MM3 (ref 0.7–3.1)
LYMPHOCYTES NFR BLD AUTO: 29.4 % (ref 19.6–45.3)
MAGNESIUM SERPL-MCNC: 2.3 MG/DL (ref 1.6–2.6)
MCH RBC QN AUTO: 27.2 PG (ref 26.6–33)
MCHC RBC AUTO-ENTMCNC: 32.1 G/DL (ref 31.5–35.7)
MCV RBC AUTO: 84.6 FL (ref 79–97)
MONOCYTES # BLD AUTO: 0.39 10*3/MM3 (ref 0.1–0.9)
MONOCYTES NFR BLD AUTO: 5.4 % (ref 5–12)
NEUTROPHILS NFR BLD AUTO: 4.56 10*3/MM3 (ref 1.7–7)
NEUTROPHILS NFR BLD AUTO: 63.1 % (ref 42.7–76)
NRBC BLD AUTO-RTO: 0 /100 WBC (ref 0–0.2)
NT-PROBNP SERPL-MCNC: 36.2 PG/ML (ref 0–450)
PLATELET # BLD AUTO: 188 10*3/MM3 (ref 140–450)
PMV BLD AUTO: 11.5 FL (ref 6–12)
POTASSIUM SERPL-SCNC: 3.4 MMOL/L (ref 3.5–5.2)
PROT SERPL-MCNC: 7.4 G/DL (ref 6–8.5)
RBC # BLD AUTO: 4.49 10*6/MM3 (ref 3.77–5.28)
SODIUM SERPL-SCNC: 141 MMOL/L (ref 136–145)
TROPONIN I SERPL-MCNC: 0.01 NG/ML (ref 0–0.6)
WBC # BLD AUTO: 7.22 10*3/MM3 (ref 3.4–10.8)
WHOLE BLOOD HOLD SPECIMEN: NORMAL

## 2021-06-22 PROCEDURE — 83690 ASSAY OF LIPASE: CPT | Performed by: EMERGENCY MEDICINE

## 2021-06-22 PROCEDURE — 80053 COMPREHEN METABOLIC PANEL: CPT | Performed by: EMERGENCY MEDICINE

## 2021-06-22 PROCEDURE — 93005 ELECTROCARDIOGRAM TRACING: CPT | Performed by: EMERGENCY MEDICINE

## 2021-06-22 PROCEDURE — 71045 X-RAY EXAM CHEST 1 VIEW: CPT

## 2021-06-22 PROCEDURE — 84484 ASSAY OF TROPONIN QUANT: CPT

## 2021-06-22 PROCEDURE — 82553 CREATINE MB FRACTION: CPT | Performed by: EMERGENCY MEDICINE

## 2021-06-22 PROCEDURE — 99283 EMERGENCY DEPT VISIT LOW MDM: CPT

## 2021-06-22 PROCEDURE — 82550 ASSAY OF CK (CPK): CPT | Performed by: EMERGENCY MEDICINE

## 2021-06-22 PROCEDURE — 83880 ASSAY OF NATRIURETIC PEPTIDE: CPT | Performed by: EMERGENCY MEDICINE

## 2021-06-22 PROCEDURE — 25010000002 KETOROLAC TROMETHAMINE PER 15 MG: Performed by: EMERGENCY MEDICINE

## 2021-06-22 PROCEDURE — 93005 ELECTROCARDIOGRAM TRACING: CPT

## 2021-06-22 PROCEDURE — 83735 ASSAY OF MAGNESIUM: CPT | Performed by: EMERGENCY MEDICINE

## 2021-06-22 PROCEDURE — 85025 COMPLETE CBC W/AUTO DIFF WBC: CPT | Performed by: EMERGENCY MEDICINE

## 2021-06-22 PROCEDURE — 96374 THER/PROPH/DIAG INJ IV PUSH: CPT

## 2021-06-22 RX ORDER — IBUPROFEN 200 MG
200 TABLET ORAL EVERY 6 HOURS PRN
COMMUNITY

## 2021-06-22 RX ORDER — ASPIRIN 81 MG/1
324 TABLET, CHEWABLE ORAL ONCE
Status: DISCONTINUED | OUTPATIENT
Start: 2021-06-22 | End: 2021-06-22

## 2021-06-22 RX ORDER — KETOROLAC TROMETHAMINE 30 MG/ML
30 INJECTION, SOLUTION INTRAMUSCULAR; INTRAVENOUS ONCE
Status: COMPLETED | OUTPATIENT
Start: 2021-06-22 | End: 2021-06-22

## 2021-06-22 RX ORDER — BACLOFEN 10 MG/1
10 TABLET ORAL 3 TIMES DAILY
COMMUNITY

## 2021-06-22 RX ORDER — SODIUM CHLORIDE 0.9 % (FLUSH) 0.9 %
10 SYRINGE (ML) INJECTION AS NEEDED
Status: DISCONTINUED | OUTPATIENT
Start: 2021-06-22 | End: 2021-06-23 | Stop reason: HOSPADM

## 2021-06-22 RX ORDER — HYDROXYZINE PAMOATE 25 MG/1
25 CAPSULE ORAL 3 TIMES DAILY PRN
COMMUNITY

## 2021-06-22 RX ADMIN — KETOROLAC TROMETHAMINE 30 MG: 30 INJECTION, SOLUTION INTRAMUSCULAR; INTRAVENOUS at 21:29

## 2021-06-23 LAB — HOLD SPECIMEN: NORMAL

## 2021-06-23 NOTE — DISCHARGE INSTRUCTIONS
Activity as tolerated.  No strenuous activities until symptoms are better.  Use Tylenol or Motrin as needed for pain.

## 2021-06-23 NOTE — ED PROVIDER NOTES
Subjective   History of Present Illness patient is a 31-year-old female who presents to the ER for evaluation of chest pain.Patient presents by EMS.  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  Patient states the pain began while she was laying down around 1700 hrs.  Patient said pain is sharp intermittent located in the middle of her chest.  Patient is at movement deep breathing and coughing make the pain worse she denies anything that makes the pain better.  She denies any radiation.  She denies any fevers, chills, cough, nausea, vomiting, or diarrhea.  She does states she has had pain similar to this before but has not lasted this long.    Review of Systems   Constitutional: Negative.  Negative for chills and fever.   HENT: Negative.    Respiratory: Negative.  Negative for cough, chest tightness and shortness of breath.    Cardiovascular: Positive for chest pain.   Gastrointestinal: Negative for abdominal pain, diarrhea, nausea and vomiting.   Endocrine: Negative.    Genitourinary: Negative.    Musculoskeletal: Negative.    Skin: Negative.    Neurological: Negative.    Psychiatric/Behavioral: Negative.    All other systems reviewed and are negative.      Past Medical History:   Diagnosis Date   • Asthma    • Hepatitis C virus    • IV drug user    • Sepsis (CMS/HCC)        Allergies   Allergen Reactions   • Tramadol Hcl Seizure       Past Surgical History:   Procedure Laterality Date   •  SECTION      X5   • GANGLION CYST EXCISION         History reviewed. No pertinent family history.    Social History     Socioeconomic History   • Marital status: Single     Spouse name: Not on file   • Number of children: Not on file   • Years of education: Not on file   • Highest education level: Not on file   Tobacco Use   • Smoking status: Current Every Day Smoker   • Smokeless tobacco: Never Used   • Tobacco comment: 5 CIGARETTES/DAY   Substance and Sexual Activity   • Alcohol use: Yes     Comment: OCC   • Drug use: Not  Currently     Comment: LAST USED HEROIN IV ABOUT 3 DAYS AGO           Objective   Physical Exam  Vital signs reviewed.  Head is normocephalic atraumatic  Pupils are equal round reactive light extract muscles are intact  Oral mucosa is pink moist without lesions or erythema.  Neck was supple.  There is no meningeal signs.  There is no thyromegaly or lymphadenopathy.  Lungs were clear to auscultation and percussion brother.  Heart was regular rate and rhythm no murmurs clicks or gallops.  Chest wall was stable patient had reproducible tenderness with palpation of the cartilage adjacent to the sternum pain was worse on the right than the left with palpation.  This reproduces identical pain patient was states that she was experiencing.  Abdomen soft and bowel sounds were present.  There is no rebound guarding or rigidity noted.  Back spine was straight and midline.  There is no CVA tenderness noted.  Extremities are intact x4.  Forage motion.  There is no edema noted.  Integument skin was intact without lesions, erythema, vesicles or pustules.  Neurologic patient is awake alert and oriented x3.  Cranial nerves II through XII are grossly intact.  Patient moves all extremities equally.  There is no focal neurological deficits noted.    Procedures           ED Course           EKG performed at 1958 was interpreted by me to show a sinus bradycardia with a ventricular rate 49 beats per.  ND interval was 116 ms.  There is normal P waves.  QRS intervals were normal.  Axis was -5 degrees.  Patient has some nonspecific ST-T wave change.  There is no acute ischemic changes noted.    Patient was seen history physical exam was performed as stated above.  The buttocks was routine results reviewed discussed with the patient.  At this point time patient is safe for discharge back to [employer].  Patient's history physical findings are consistent with costochondritis.  Patient safe for discharge home.                                 MDM  Number of Diagnoses or Management Options     Amount and/or Complexity of Data Reviewed  Clinical lab tests: reviewed  Tests in the radiology section of CPT®: reviewed  Tests in the medicine section of CPT®: reviewed    Risk of Complications, Morbidity, and/or Mortality  Presenting problems: moderate  Management options: moderate        Final diagnoses:   Costochondritis   Sinus bradycardia       ED Disposition  ED Disposition     ED Disposition Condition Comment    Discharge Stable             Follow-up with primary care provider as needed.             Medication List      No changes were made to your prescriptions during this visit.          Osvaldo Adame DO  06/22/21 1440

## 2021-06-24 LAB — QT INTERVAL: 485 MS

## 2022-09-29 ENCOUNTER — HOSPITAL ENCOUNTER (INPATIENT)
Dept: HOSPITAL 74 - YASAS | Age: 32
LOS: 2 days | Discharge: LEFT BEFORE DISCHARGE | DRG: 770 | End: 2022-10-01
Attending: SURGERY | Admitting: ALLERGY & IMMUNOLOGY
Payer: COMMERCIAL

## 2022-09-29 VITALS — BODY MASS INDEX: 22.3 KG/M2

## 2022-09-29 DIAGNOSIS — F19.24: ICD-10-CM

## 2022-09-29 DIAGNOSIS — Z59.02: ICD-10-CM

## 2022-09-29 DIAGNOSIS — Z28.310: ICD-10-CM

## 2022-09-29 DIAGNOSIS — Z86.19: ICD-10-CM

## 2022-09-29 DIAGNOSIS — Z88.5: ICD-10-CM

## 2022-09-29 DIAGNOSIS — F17.210: ICD-10-CM

## 2022-09-29 DIAGNOSIS — F12.20: ICD-10-CM

## 2022-09-29 DIAGNOSIS — Z28.9: ICD-10-CM

## 2022-09-29 DIAGNOSIS — F43.10: ICD-10-CM

## 2022-09-29 DIAGNOSIS — R07.9: ICD-10-CM

## 2022-09-29 DIAGNOSIS — F11.23: Primary | ICD-10-CM

## 2022-09-29 DIAGNOSIS — Z86.69: ICD-10-CM

## 2022-09-29 DIAGNOSIS — R10.13: ICD-10-CM

## 2022-09-29 DIAGNOSIS — F14.20: ICD-10-CM

## 2022-09-29 LAB
ALBUMIN SERPL-MCNC: 3.6 G/DL (ref 3.4–5)
ALP SERPL-CCNC: 94 U/L (ref 45–117)
ALT SERPL-CCNC: 329 U/L (ref 13–61)
ANION GAP SERPL CALC-SCNC: 6 MMOL/L (ref 8–16)
AST SERPL-CCNC: 113 U/L (ref 15–37)
BILIRUB SERPL-MCNC: 0.7 MG/DL (ref 0.2–1)
BUN SERPL-MCNC: 17.1 MG/DL (ref 7–18)
CALCIUM SERPL-MCNC: 9.3 MG/DL (ref 8.5–10.1)
CHLORIDE SERPL-SCNC: 103 MMOL/L (ref 98–107)
CO2 SERPL-SCNC: 33 MMOL/L (ref 21–32)
CREAT SERPL-MCNC: 0.9 MG/DL (ref 0.55–1.3)
DEPRECATED RDW RBC AUTO: 15.8 % (ref 11.6–15.6)
GLUCOSE SERPL-MCNC: 92 MG/DL (ref 74–106)
HCT VFR BLD CALC: 41.1 % (ref 32.4–45.2)
HGB BLD-MCNC: 13 GM/DL (ref 10.7–15.3)
MCH RBC QN AUTO: 26.9 PG (ref 25.7–33.7)
MCHC RBC AUTO-ENTMCNC: 31.7 G/DL (ref 32–36)
MCV RBC: 84.7 FL (ref 80–96)
PLATELET # BLD AUTO: 230 10^3/UL (ref 134–434)
PMV BLD: 9.8 FL (ref 7.5–11.1)
PROT SERPL-MCNC: 7.4 G/DL (ref 6.4–8.2)
RBC # BLD AUTO: 4.85 M/MM3 (ref 3.6–5.2)
SODIUM SERPL-SCNC: 142 MMOL/L (ref 136–145)
WBC # BLD AUTO: 7.5 K/MM3 (ref 4–10)

## 2022-09-29 PROCEDURE — HZ2ZZZZ DETOXIFICATION SERVICES FOR SUBSTANCE ABUSE TREATMENT: ICD-10-PCS | Performed by: SURGERY

## 2022-09-29 RX ADMIN — NICOTINE PRN MG: 4 INHALANT RESPIRATORY (INHALATION) at 15:53

## 2022-09-29 RX ADMIN — HYDROXYZINE PAMOATE SCH: 25 CAPSULE ORAL at 18:15

## 2022-09-29 RX ADMIN — HYDROXYZINE PAMOATE SCH MG: 25 CAPSULE ORAL at 14:46

## 2022-09-29 RX ADMIN — Medication SCH: at 22:28

## 2022-09-29 RX ADMIN — METHOCARBAMOL PRN MG: 500 TABLET ORAL at 20:09

## 2022-09-29 RX ADMIN — Medication SCH TAB: at 14:48

## 2022-09-29 RX ADMIN — HYDROXYZINE PAMOATE SCH: 25 CAPSULE ORAL at 22:28

## 2022-09-29 SDOH — ECONOMIC STABILITY - HOUSING INSECURITY: UNSHELTERED HOMELESSNESS: Z59.02

## 2022-09-30 RX ADMIN — HYDROXYZINE PAMOATE SCH MG: 25 CAPSULE ORAL at 10:59

## 2022-09-30 RX ADMIN — NICOTINE PRN MG: 4 INHALANT RESPIRATORY (INHALATION) at 21:36

## 2022-09-30 RX ADMIN — HYDROXYZINE PAMOATE SCH MG: 25 CAPSULE ORAL at 06:40

## 2022-09-30 RX ADMIN — HYDROXYZINE PAMOATE SCH MG: 25 CAPSULE ORAL at 14:50

## 2022-09-30 RX ADMIN — BISMUTH SUBSALICYLATE PRN MG: 525 LIQUID ORAL at 06:48

## 2022-09-30 RX ADMIN — IBUPROFEN PRN MG: 600 TABLET, FILM COATED ORAL at 08:07

## 2022-09-30 RX ADMIN — Medication SCH TAB: at 10:59

## 2022-09-30 RX ADMIN — NICOTINE SCH: 14 PATCH, EXTENDED RELEASE TRANSDERMAL at 11:00

## 2022-09-30 RX ADMIN — Medication SCH MG: at 22:32

## 2022-09-30 RX ADMIN — METHOCARBAMOL PRN MG: 500 TABLET ORAL at 22:32

## 2022-09-30 RX ADMIN — METHOCARBAMOL PRN MG: 500 TABLET ORAL at 08:07

## 2022-10-01 VITALS — SYSTOLIC BLOOD PRESSURE: 101 MMHG | TEMPERATURE: 97.2 F | HEART RATE: 73 BPM | DIASTOLIC BLOOD PRESSURE: 59 MMHG

## 2022-10-01 VITALS — RESPIRATION RATE: 18 BRPM

## 2022-10-01 RX ADMIN — METHOCARBAMOL PRN MG: 500 TABLET ORAL at 15:14

## 2022-10-01 RX ADMIN — BISMUTH SUBSALICYLATE PRN MG: 525 LIQUID ORAL at 12:32

## 2022-10-01 RX ADMIN — METHOCARBAMOL PRN MG: 500 TABLET ORAL at 09:12

## 2022-10-01 RX ADMIN — NICOTINE SCH: 14 PATCH, EXTENDED RELEASE TRANSDERMAL at 09:18

## 2022-10-01 RX ADMIN — Medication SCH TAB: at 09:11

## 2022-10-01 RX ADMIN — IBUPROFEN PRN MG: 600 TABLET, FILM COATED ORAL at 03:15
